# Patient Record
Sex: FEMALE | Race: BLACK OR AFRICAN AMERICAN | Employment: FULL TIME | URBAN - METROPOLITAN AREA
[De-identification: names, ages, dates, MRNs, and addresses within clinical notes are randomized per-mention and may not be internally consistent; named-entity substitution may affect disease eponyms.]

---

## 2018-02-26 ENCOUNTER — PAT TELEPHONE (OUTPATIENT)
Dept: PREADMISSION TESTING | Age: 53
End: 2018-02-26

## 2018-02-26 VITALS — BODY MASS INDEX: 29.19 KG/M2 | WEIGHT: 171 LBS | HEIGHT: 64 IN

## 2018-02-27 ENCOUNTER — HOSPITAL ENCOUNTER (OUTPATIENT)
Dept: ENDOSCOPY | Age: 53
Discharge: HOME | End: 2018-02-27
Attending: INTERNAL MEDICINE | Admitting: INTERNAL MEDICINE

## 2018-02-27 VITALS
BODY MASS INDEX: 29.06 KG/M2 | DIASTOLIC BLOOD PRESSURE: 95 MMHG | SYSTOLIC BLOOD PRESSURE: 126 MMHG | RESPIRATION RATE: 16 BRPM | HEIGHT: 64 IN | OXYGEN SATURATION: 100 % | WEIGHT: 170.25 LBS | HEART RATE: 81 BPM | TEMPERATURE: 97 F

## 2018-02-27 LAB — PREGNANCY, URINE: NEGATIVE

## 2018-02-27 RX ORDER — SODIUM CHLORIDE 0.9 % (FLUSH) 0.9 %
10 SYRINGE (ML) INJECTION EVERY 12 HOURS SCHEDULED
Status: DISCONTINUED | OUTPATIENT
Start: 2018-02-27 | End: 2018-02-28 | Stop reason: HOSPADM

## 2018-02-27 RX ORDER — SODIUM CHLORIDE 9 MG/ML
INJECTION, SOLUTION INTRAVENOUS CONTINUOUS
Status: DISCONTINUED | OUTPATIENT
Start: 2018-02-27 | End: 2018-02-28 | Stop reason: HOSPADM

## 2018-02-27 RX ORDER — LABETALOL HYDROCHLORIDE 5 MG/ML
5 INJECTION, SOLUTION INTRAVENOUS EVERY 10 MIN PRN
Status: DISCONTINUED | OUTPATIENT
Start: 2018-02-27 | End: 2018-02-28 | Stop reason: HOSPADM

## 2018-02-27 RX ORDER — ONDANSETRON 2 MG/ML
4 INJECTION INTRAMUSCULAR; INTRAVENOUS
Status: ACTIVE | OUTPATIENT
Start: 2018-02-27 | End: 2018-02-27

## 2018-02-27 RX ORDER — SODIUM CHLORIDE 0.9 % (FLUSH) 0.9 %
10 SYRINGE (ML) INJECTION PRN
Status: DISCONTINUED | OUTPATIENT
Start: 2018-02-27 | End: 2018-02-28 | Stop reason: HOSPADM

## 2018-02-27 RX ORDER — PROMETHAZINE HYDROCHLORIDE 25 MG/ML
6.25 INJECTION, SOLUTION INTRAMUSCULAR; INTRAVENOUS
Status: ACTIVE | OUTPATIENT
Start: 2018-02-27 | End: 2018-02-27

## 2018-02-27 RX ADMIN — SODIUM CHLORIDE: 9 INJECTION, SOLUTION INTRAVENOUS at 08:08

## 2018-02-27 ASSESSMENT — PAIN SCALES - GENERAL
PAINLEVEL_OUTOF10: 0
PAINLEVEL_OUTOF10: 0

## 2018-02-27 ASSESSMENT — PAIN - FUNCTIONAL ASSESSMENT: PAIN_FUNCTIONAL_ASSESSMENT: 0-10

## 2018-02-27 ASSESSMENT — PAIN SCALES - WONG BAKER: WONGBAKER_NUMERICALRESPONSE: 0

## 2018-02-27 NOTE — PROCEDURES
[facility]  Endoscopy Note    Patient: Benjamín Babb  : 1965  Acct#: [de-identified]    Procedure: Colonoscopy     Date:  2018    Surgeon:  Yogi Millan MD    Referring Physician:  Tran    Previous Colonoscopy: Yes  Date: 16  Greater than 3 years? No    Preoperative Diagnosis:  Hemoccult positive stool    Postoperative Diagnosis:  Hemorrhoids    Anesthesia:  See anesthesia note    Indications: This is a 45y.o. year old female who presents today with hemoccult positive stool. Procedure: An informed consent was obtained from the patient after explanation of indications, benefits, possible risks and complications of the procedure. The patient was then taken to the endoscopy suite, placed in the left lateral decubitus position, and the above IV anesthesia was administered. A digital rectal examination was performed and revealed negative without mass, lesions or tenderness. The Olympus CFQ-180-AL video colonoscope was placed in the patient's rectum under digital direction and advanced to the cecum. The cecum was identified by characteristic anatomy and ballottment. The prep was excellent. The ileocecal valve was identified. The scope was then withdrawn back through the cecum, ascending, transverse, descending and sigmoid colons. Carefull circumferential examination of the mucosa in these areas demonstrated normal colonic mucosa throughout. There are internal hemorrhoids. The scope was then withdrawn into the rectum and retroflexed. The retroflexed view of the anal verge and rectum demonstrates no abnormalities. The scope was straightened, the colon was decompressed and the scope was withdrawn from the patient. The patient tolerated the procedure well and was taken to the PACU in good condition. Estimated Blood Loss:  none    Impression:  Internal hemorrhoids    Recommendations:  Repeat colonoscopy in 5 years.     Yogi Millan MD   [facility]  2018

## 2018-02-27 NOTE — ANESTHESIA POST-OP
[facility] Department of Anesthesiology  Post-Anesthesia Note       Name:  Brad Turpin                                  Age:  45 y.o. MRN:  [id number]     Last Vitals & Oxygen Saturation: BP (!) 126/95   Pulse 81   Temp 97 °F (36.1 °C) (Temporal)   Resp 16   Ht 5' 4\" (1.626 m)   Wt 170 lb 4 oz (77.2 kg)   LMP 01/29/2018   SpO2 100%   BMI 29.22 kg/m²   Patient Vitals for the past 4 hrs:   BP Temp Temp src Pulse Resp SpO2 Height Weight   02/27/18 0903 (!) 126/95 - - 81 16 100 % - -   02/27/18 0853 109/73 - - 80 16 100 % - -   02/27/18 0843 106/69 97 °F (36.1 °C) Temporal 87 18 100 % - -   02/27/18 0758 137/88 97.8 °F (36.6 °C) Temporal 76 14 100 % 5' 4\" (1.626 m) 170 lb 4 oz (77.2 kg)       Level of consciousness:  Awake, alert    Respiratory: Respirations easy, no distress. Stable. Cardiovascular: Hemodynamically stable. Hydration: Adequate. PONV: Adequately managed. Post-op pain: Adequately controlled. Post-op assessment: Tolerated anesthetic well without complication. Complications:  None.     Grace Winston MD  February 27, 2018   10:34 AM

## 2018-02-27 NOTE — ANESTHESIA PRE-OP
GI/Hepatic/Renal: Neg GI/Hepatic/Renal ROS  (+) bowel prep,           Endo/Other: Negative Endo/Other ROS                    Abdominal:           Vascular: negative vascular ROS. Anesthesia Plan      MAC     ASA 2       Induction: intravenous. Anesthetic plan and risks discussed with patient. Plan discussed with CRNA. This pre-anesthesia assessment may be used as a history and physical.    DOS STAFF ADDENDUM:    Pt seen and examined, chart reviewed (including anesthesia, drug and allergy history). No interval changes to history and physical examination. Anesthetic plan, risks, benefits, alternatives, and personnel involved discussed with patient. Patient verbalized an understanding and agrees to proceed.       Joelle Escobar MD  February 27, 2018  8:16 AM

## 2019-06-19 RX ORDER — M-VIT,TX,IRON,MINS/CALC/FOLIC 27MG-0.4MG
1 TABLET ORAL DAILY
COMMUNITY

## 2019-06-19 RX ORDER — HYDROCODONE BITARTRATE AND ACETAMINOPHEN 5; 325 MG/1; MG/1
1 TABLET ORAL EVERY 6 HOURS PRN
COMMUNITY

## 2019-06-19 RX ORDER — ACETAMINOPHEN 160 MG
TABLET,DISINTEGRATING ORAL
COMMUNITY

## 2019-06-19 NOTE — PROGRESS NOTES
[facility] time___0800_________        Surgery time___0900_________    Take the following medications with a sip of water:    Do not eat or drink anything after 12:00 midnight prior to your surgery. This includes water chewing gum, mints and ice chips. You may brush your teeth and gargle the morning of your surgery, but do not swallow the water    You may be asked to stop blood thinners such as Coumadin, Plavix, Fragmin, Lovenox, etc., or any anti-inflammatories such as:  Aspirin, Ibuprofen, Advil, Naproxen prior to your surgery. We also ask that you stop any OTC medications such as fish oil, vitamin E, glucosamine, garlic, Multivitamins, COQ 10, etc.    We ask that you do not smoke 24 hours prior to surgery  We ask that you do not  drink any alcoholic beverages 24 hours prior to surgery     You must make arrangements for a responsible adult to take you home after your surgery. For your safety you will not be allowed to leave alone or drive yourself home. Your surgery will be cancelled if you do not have a ride home. Also for your safety, it is strongly suggested that someone stay with you the first 24 hours after your surgery. A parent or legal guardian must accompany a child scheduled for surgery and plan to stay at the hospital until the child is discharged. Please do not bring other children with you. For your comfort, please wear simple loose fitting clothing to the hospital.  Please do not bring valuables. Do not wear any make-up or nail polish on your fingers or toes      For your safety, please do not wear any jewelry or body piercing's on the day of surgery. All jewelry must be removed. If you have dentures, they will be removed before going to operating room. For your convenience, we will provide you with a container. If you wear contact lenses or glasses, they will be removed, please bring a case for them.      If you have a living will and a durable power of  for healthcare, please bring in a copy. As part of our patient safety program to minimize surgical site infections, we ask you to do the following:    · Please notify your surgeon if you develop any illness between         now and the  day of your surgery. · This includes a cough, cold, fever, sore throat, nausea,         or vomiting, and diarrhea, etc.  ·  Please notify your surgeon if you experience dizziness, shortness         of breath or blurred vision between now and the time of your surgery. You may shower the night before surgery or the morning of   your surgery with an antibacterial soap. You will need to bring a photo ID and insurance card    [facility] has an onsite pharmacy, would you like to utilize our pharmacy     If you will be staying overnight and use a C-pap machine, please bring   your C-pap to hospital     Our goal is to provide you with excellent care, therefore, visitors will be limited to two(2) in the room at a time so that we may focus on providing this care for you. Please contact pre-admission testing if you have any further questions. [facility] phone number:  [phone]  Please note these are generalized instructions for all surgical cases, you may be provided with more specific instructions according to your surgery.

## 2019-06-26 ENCOUNTER — ANESTHESIA EVENT (OUTPATIENT)
Dept: ENDOSCOPY | Age: 54
End: 2019-06-26
Payer: COMMERCIAL

## 2019-06-27 ENCOUNTER — HOSPITAL ENCOUNTER (OUTPATIENT)
Age: 54
Setting detail: OUTPATIENT SURGERY
Discharge: HOME | End: 2019-06-27
Attending: INTERNAL MEDICINE | Admitting: INTERNAL MEDICINE
Payer: COMMERCIAL

## 2019-06-27 ENCOUNTER — ANESTHESIA (OUTPATIENT)
Dept: ENDOSCOPY | Age: 54
End: 2019-06-27
Payer: COMMERCIAL

## 2019-06-27 VITALS
HEIGHT: 65 IN | OXYGEN SATURATION: 100 % | WEIGHT: 175.4 LBS | HEART RATE: 68 BPM | SYSTOLIC BLOOD PRESSURE: 148 MMHG | DIASTOLIC BLOOD PRESSURE: 92 MMHG | TEMPERATURE: 97 F | BODY MASS INDEX: 29.22 KG/M2 | RESPIRATION RATE: 16 BRPM

## 2019-06-27 VITALS
TEMPERATURE: 97.7 F | DIASTOLIC BLOOD PRESSURE: 68 MMHG | SYSTOLIC BLOOD PRESSURE: 107 MMHG | RESPIRATION RATE: 21 BRPM | OXYGEN SATURATION: 97 %

## 2019-06-27 DIAGNOSIS — K21.9 GASTROESOPHAGEAL REFLUX DISEASE, ESOPHAGITIS PRESENCE NOT SPECIFIED: ICD-10-CM

## 2019-06-27 PROCEDURE — 3700000000 HC ANESTHESIA ATTENDED CARE: Performed by: INTERNAL MEDICINE

## 2019-06-27 PROCEDURE — 3609012400 HC EGD TRANSORAL BIOPSY SINGLE/MULTIPLE: Performed by: INTERNAL MEDICINE

## 2019-06-27 PROCEDURE — 2580000003 HC RX 258: Performed by: ANESTHESIOLOGY

## 2019-06-27 PROCEDURE — 2580000003 HC RX 258: Performed by: NURSE ANESTHETIST, CERTIFIED REGISTERED

## 2019-06-27 PROCEDURE — 88305 TISSUE EXAM BY PATHOLOGIST: CPT

## 2019-06-27 PROCEDURE — 6360000002 HC RX W HCPCS: Performed by: NURSE ANESTHETIST, CERTIFIED REGISTERED

## 2019-06-27 PROCEDURE — 2500000003 HC RX 250 WO HCPCS: Performed by: NURSE ANESTHETIST, CERTIFIED REGISTERED

## 2019-06-27 PROCEDURE — 7100000011 HC PHASE II RECOVERY - ADDTL 15 MIN: Performed by: INTERNAL MEDICINE

## 2019-06-27 PROCEDURE — 7100000010 HC PHASE II RECOVERY - FIRST 15 MIN: Performed by: INTERNAL MEDICINE

## 2019-06-27 PROCEDURE — 2709999900 HC NON-CHARGEABLE SUPPLY: Performed by: INTERNAL MEDICINE

## 2019-06-27 PROCEDURE — 3700000001 HC ADD 15 MINUTES (ANESTHESIA): Performed by: INTERNAL MEDICINE

## 2019-06-27 RX ORDER — LIDOCAINE HYDROCHLORIDE 20 MG/ML
INJECTION, SOLUTION EPIDURAL; INFILTRATION; INTRACAUDAL; PERINEURAL PRN
Status: DISCONTINUED | OUTPATIENT
Start: 2019-06-27 | End: 2019-06-27 | Stop reason: SDUPTHER

## 2019-06-27 RX ORDER — ONDANSETRON 2 MG/ML
4 INJECTION INTRAMUSCULAR; INTRAVENOUS
Status: DISCONTINUED | OUTPATIENT
Start: 2019-06-27 | End: 2019-06-27 | Stop reason: HOSPADM

## 2019-06-27 RX ORDER — SODIUM CHLORIDE 9 MG/ML
INJECTION, SOLUTION INTRAVENOUS CONTINUOUS
Status: DISCONTINUED | OUTPATIENT
Start: 2019-06-27 | End: 2019-06-27 | Stop reason: HOSPADM

## 2019-06-27 RX ORDER — LIDOCAINE HYDROCHLORIDE 10 MG/ML
1 INJECTION, SOLUTION EPIDURAL; INFILTRATION; INTRACAUDAL; PERINEURAL
Status: DISCONTINUED | OUTPATIENT
Start: 2019-06-27 | End: 2019-06-27 | Stop reason: HOSPADM

## 2019-06-27 RX ORDER — SODIUM CHLORIDE 9 MG/ML
INJECTION, SOLUTION INTRAVENOUS CONTINUOUS PRN
Status: DISCONTINUED | OUTPATIENT
Start: 2019-06-27 | End: 2019-06-27 | Stop reason: SDUPTHER

## 2019-06-27 RX ORDER — SODIUM CHLORIDE 0.9 % (FLUSH) 0.9 %
10 SYRINGE (ML) INJECTION EVERY 12 HOURS SCHEDULED
Status: DISCONTINUED | OUTPATIENT
Start: 2019-06-27 | End: 2019-06-27 | Stop reason: HOSPADM

## 2019-06-27 RX ORDER — SODIUM CHLORIDE 0.9 % (FLUSH) 0.9 %
10 SYRINGE (ML) INJECTION PRN
Status: DISCONTINUED | OUTPATIENT
Start: 2019-06-27 | End: 2019-06-27 | Stop reason: HOSPADM

## 2019-06-27 RX ORDER — PROPOFOL 10 MG/ML
INJECTION, EMULSION INTRAVENOUS PRN
Status: DISCONTINUED | OUTPATIENT
Start: 2019-06-27 | End: 2019-06-27 | Stop reason: SDUPTHER

## 2019-06-27 RX ADMIN — PROPOFOL 200 MG: 10 INJECTION, EMULSION INTRAVENOUS at 08:57

## 2019-06-27 RX ADMIN — SODIUM CHLORIDE: 9 INJECTION, SOLUTION INTRAVENOUS at 08:57

## 2019-06-27 RX ADMIN — LIDOCAINE HYDROCHLORIDE 100 MG: 20 INJECTION, SOLUTION EPIDURAL; INFILTRATION; INTRACAUDAL; PERINEURAL at 08:57

## 2019-06-27 RX ADMIN — SODIUM CHLORIDE: 0.9 INJECTION, SOLUTION INTRAVENOUS at 08:42

## 2019-06-27 ASSESSMENT — PAIN SCALES - GENERAL
PAINLEVEL_OUTOF10: 0

## 2019-06-27 ASSESSMENT — ENCOUNTER SYMPTOMS: SHORTNESS OF BREATH: 0

## 2019-06-27 ASSESSMENT — PAIN - FUNCTIONAL ASSESSMENT: PAIN_FUNCTIONAL_ASSESSMENT: 0-10

## 2019-06-27 NOTE — H&P
[facility]   Pre-operative History and Physical    Patient: Craig Lerma  : 1965  Acct#: [de-identified]    History Obtained From: electronic medical record    HISTORY OF PRESENT ILLNESS  Procedure:EGD  Indications:GERD  Past Medical History:        Diagnosis Date    Hyperlipidemia     Hypertension      Past Surgical History:        Procedure Laterality Date     SECTION      LAPAROSCOPY       Medications prior to admission:   Prior to Admission medications    Medication Sig Start Date End Date Taking? Authorizing Provider   LOSARTAN POTASSIUM PO Take by mouth   Yes Historical Provider, MD   Multiple Vitamins-Minerals (THERAPEUTIC MULTIVITAMIN-MINERALS) tablet Take 1 tablet by mouth daily   Yes Historical Provider, MD   Cholecalciferol (VITAMIN D3) 2000 units CAPS Take by mouth   Yes Historical Provider, MD   HYDROcodone-acetaminophen (NORCO) 5-325 MG per tablet Take 1 tablet by mouth every 6 hours as needed for Pain. Yes Historical Provider, MD   SIMVASTATIN PO Take 20 mg by mouth    Yes Historical Provider, MD     Allergies:   Patient has no known allergies. Social History     Socioeconomic History    Marital status:      Spouse name: Not on file    Number of children: Not on file    Years of education: Not on file    Highest education level: Not on file   Occupational History    Not on file   Social Needs    Financial resource strain: Not on file    Food insecurity:     Worry: Not on file     Inability: Not on file    Transportation needs:     Medical: Not on file     Non-medical: Not on file   Tobacco Use    Smoking status: Former Smoker    Smokeless tobacco: Never Used   Substance and Sexual Activity    Alcohol use:  Yes    Drug use: No    Sexual activity: Not on file   Lifestyle    Physical activity:     Days per week: Not on file     Minutes per session: Not on file    Stress: Not on file   Relationships    Social connections:     Talks on phone: Not on

## 2019-06-27 NOTE — ANESTHESIA POSTPROCEDURE EVALUATION
Department of Anesthesiology  Postprocedure Note    Patient: Mayra Ramos  MRN: [id number]  YOB: 1965  Date of evaluation: 6/27/2019  Time:  9:42 AM     Procedure Summary     Date:  06/27/19 Room / Location:  [facility] ENDO 02 / [facility] ENDOSCOPY    Anesthesia Start:  6887 Anesthesia Stop:  1199    Procedure:  EGD BIOPSY (N/A ) Diagnosis:       Gastroesophageal reflux disease, esophagitis presence not specified      (REFLUX)    Surgeon:  Magda Wei MD Responsible Provider:  Vinicio Bassett MD    Anesthesia Type:  MAC ASA Status:  2          Anesthesia Type: MAC    Mary Phase I: Mary Score: 10    Mary Phase II: Mary Score: 10    Last vitals: Reviewed and per EMR flowsheets.        Anesthesia Post Evaluation    Patient location during evaluation: bedside  Patient participation: complete - patient participated  Level of consciousness: awake  Pain score: 1  Airway patency: patent  Nausea & Vomiting: no nausea and no vomiting  Complications: no  Cardiovascular status: blood pressure returned to baseline  Respiratory status: acceptable  Hydration status: euvolemic

## 2019-06-27 NOTE — ANESTHESIA PRE PROCEDURE
Department of Anesthesiology  Preprocedure Note       Name:  Filomena Cardenas   Age:  52 y.o.  :  1965                                          MRN:  [id number]         Date:  2019      Surgeon: Jennifer Vargas):  Robert Mccormick MD    Procedure: EGD ESOPHAGOGASTRODUODENOSCOPY (N/A )    Medications prior to admission:   Prior to Admission medications    Medication Sig Start Date End Date Taking? Authorizing Provider   LOSARTAN POTASSIUM PO Take by mouth   Yes Historical Provider, MD   Multiple Vitamins-Minerals (THERAPEUTIC MULTIVITAMIN-MINERALS) tablet Take 1 tablet by mouth daily   Yes Historical Provider, MD   Cholecalciferol (VITAMIN D3) 2000 units CAPS Take by mouth   Yes Historical Provider, MD   HYDROcodone-acetaminophen (NORCO) 5-325 MG per tablet Take 1 tablet by mouth every 6 hours as needed for Pain. Yes Historical Provider, MD   SIMVASTATIN PO Take 20 mg by mouth    Yes Historical Provider, MD       Current medications:    No current facility-administered medications for this encounter. Current Outpatient Medications   Medication Sig Dispense Refill    LOSARTAN POTASSIUM PO Take by mouth      Multiple Vitamins-Minerals (THERAPEUTIC MULTIVITAMIN-MINERALS) tablet Take 1 tablet by mouth daily      Cholecalciferol (VITAMIN D3) 2000 units CAPS Take by mouth      HYDROcodone-acetaminophen (NORCO) 5-325 MG per tablet Take 1 tablet by mouth every 6 hours as needed for Pain.  SIMVASTATIN PO Take 20 mg by mouth          Allergies:  No Known Allergies    Problem List:  There is no problem list on file for this patient. Past Medical History:        Diagnosis Date    Hyperlipidemia     Hypertension        Past Surgical History:        Procedure Laterality Date     SECTION      LAPAROSCOPY         Social History:    Social History     Tobacco Use    Smoking status: Former Smoker    Smokeless tobacco: Never Used   Substance Use Topics    Alcohol use:  Yes Counseling given: Not Answered      Vital Signs (Current):   Vitals:    06/19/19 1231   Weight: 175 lb (79.4 kg)   Height: 5' 4.5\" (1.638 m)                                              BP Readings from Last 3 Encounters:   02/27/18 (!) 126/95       NPO Status:                                                                                 BMI:   Wt Readings from Last 3 Encounters:   02/27/18 170 lb 4 oz (77.2 kg)   02/26/18 171 lb (77.6 kg)   05/10/10 180 lb (81.6 kg)     Body mass index is 29.57 kg/m². CBC: No results found for: WBC, RBC, HGB, HCT, MCV, RDW, PLT    CMP: No results found for: NA, K, CL, CO2, BUN, CREATININE, GFRAA, AGRATIO, LABGLOM, GLUCOSE, PROT, CALCIUM, BILITOT, ALKPHOS, AST, ALT    POC Tests: No results for input(s): POCGLU, POCNA, POCK, POCCL, POCBUN, POCHEMO, POCHCT in the last 72 hours. Coags: No results found for: PROTIME, INR, APTT    HCG (If Applicable):   Lab Results   Component Value Date    PREGTESTUR Negative 02/27/2018        ABGs: No results found for: PHART, PO2ART, LZY5RXD, THB6XAG, BEART, I7HVAPES     Type & Screen (If Applicable):  No results found for: KORY [facility]    Anesthesia Evaluation  Patient summary reviewed no history of anesthetic complications:   Airway: Mallampati: II  TM distance: >3 FB   Neck ROM: full  Mouth opening: > = 3 FB Dental:          Pulmonary:       (-) shortness of breath, recent URI and sleep apnea                           Cardiovascular:    (+) hypertension:, hyperlipidemia                  Neuro/Psych:      (-) neuromuscular disease, TIA and CVA           GI/Hepatic/Renal:   (+) liver disease:,      (-) GERD and hepatitis       Endo/Other:        (-) diabetes mellitus, hypothyroidism, hyperthyroidism               Abdominal:           Vascular:     - PVD, DVT and PE.                                This pre-anesthesia assessment may be used as a history and physical.    DOS STAFF ADDENDUM:    Pt seen and examined, chart reviewed (including anesthesia, drug and allergy history). No interval changes to history and physical examination. Anesthetic plan, risks, benefits, alternatives, and personnel involved discussed with patient. Patient verbalized an understanding and agrees to proceed. Shabbir Ballard MD  June 27, 2019  7:18 AM       Anesthesia Plan      MAC     ASA 2       Induction: intravenous. Anesthetic plan and risks discussed with patient. Plan discussed with CRNA.     Attending anesthesiologist reviewed and agrees with Pre Eval content              Shabbir Ballard MD   6/27/2019

## 2022-04-18 ENCOUNTER — OFFICE VISIT (OUTPATIENT)
Dept: ORTHOPEDIC SURGERY | Age: 57
End: 2022-04-18
Payer: COMMERCIAL

## 2022-04-18 DIAGNOSIS — M16.11 PRIMARY OSTEOARTHRITIS OF RIGHT HIP: ICD-10-CM

## 2022-04-18 DIAGNOSIS — M25.551 RIGHT HIP PAIN: Primary | ICD-10-CM

## 2022-04-18 PROCEDURE — 99204 OFFICE O/P NEW MOD 45 MIN: CPT | Performed by: ORTHOPAEDIC SURGERY

## 2022-04-18 RX ORDER — CELECOXIB 200 MG/1
200 CAPSULE ORAL DAILY
Qty: 60 CAPSULE | Refills: 3 | Status: SHIPPED | OUTPATIENT
Start: 2022-04-18 | End: 2022-10-17 | Stop reason: SDUPTHER

## 2022-04-18 NOTE — PROGRESS NOTES
Date:  2022    Name:  Coy Cruz  Address:  [address] [place] [address]    :  1965      Age:   60 y.o.    SSN:  [id number]      Medical Record Number:  [id number]    Reason for Visit:    Chief Complaint    Hip Pain (new patient right hip )      DOS:2022     HPI: Coy Cruz is a 60 y.o. female here today for evaluation of right hip pain that has been ongoing for 6 years with no associated injury. She has been treated in the past for mild osteoarthritis with formal supervised physical therapy. This did not provide any relief. She has tried greater trochanteric bursitis injections with no improvement. Patient describes pain that is in her groin and deep. She feels like there is a stabbing pain deep in her groin and she has very compressed. ROS: Review of systems reviewed from Patient History Form completed today and available in the patient's chart under the Media tab. Past Medical History:   Diagnosis Date    Hyperlipidemia     Hypertension         Past Surgical History:   Procedure Laterality Date     SECTION      LAPAROSCOPY      UPPER GASTROINTESTINAL ENDOSCOPY N/A 2019    EGD BIOPSY performed by Selam Lara MD at [place] [address]       Family History   Problem Relation Age of Onset    High Blood Pressure Mother     High Cholesterol Mother     High Blood Pressure Father     High Cholesterol Father        Social History     Socioeconomic History    Marital status:      Spouse name: Not on file    Number of children: Not on file    Years of education: Not on file    Highest education level: Not on file   Occupational History    Not on file   Tobacco Use    Smoking status: Former Smoker    Smokeless tobacco: Never Used   Vaping Use    Vaping Use: Never used   Substance and Sexual Activity    Alcohol use:  Yes    Drug use: No    Sexual activity: Not on file   Other Topics Concern    Not on file   Social History Narrative    Not on file     Social Determinants of Health     Financial Resource Strain:     Difficulty of Paying Living Expenses: Not on file   Food Insecurity:     Worried About Running Out of Food in the Last Year: Not on file    [place] of Food in the Last Year: Not on file   Transportation Needs:     Lack of Transportation (Medical): Not on file    Lack of Transportation (Non-Medical): Not on file   Physical Activity:     Days of Exercise per Week: Not on file    Minutes of Exercise per Session: Not on file   Stress:     Feeling of Stress : Not on file   Social Connections:     Frequency of Communication with Friends and Family: Not on file    Frequency of Social Gatherings with Friends and Family: Not on file    Attends Worship Services: Not on file    Active Member of [address] [place] [address] [employer] or Organizations: Not on file    Attends Club or Organization Meetings: Not on file    Marital Status: Not on file   Intimate Partner Violence:     Fear of Current or Ex-Partner: Not on file    Emotionally Abused: Not on file    Physically Abused: Not on file    Sexually Abused: Not on file   Housing Stability:     Unable to Pay for Housing in the Last Year: Not on file    Number of [place] in the Last Year: Not on file    Unstable Housing in the Last Year: Not on file       Current Outpatient Medications   Medication Sig Dispense Refill    celecoxib (CELEBREX) 200 MG capsule Take 1 capsule by mouth daily 60 capsule 3    LOSARTAN POTASSIUM PO Take by mouth      Multiple Vitamins-Minerals (THERAPEUTIC MULTIVITAMIN-MINERALS) tablet Take 1 tablet by mouth daily      Cholecalciferol (VITAMIN D3) 2000 units CAPS Take by mouth      HYDROcodone-acetaminophen (NORCO) 5-325 MG per tablet Take 1 tablet by mouth every 6 hours as needed for Pain.  SIMVASTATIN PO Take 20 mg by mouth        No current facility-administered medications for this visit. No Known Allergies    Vital signs:   There were no vitals taken for this visit. Right hip examination:    Gait: Normal     Skin: There are no rashes, ulcerations or lesions. Inspection:  No erythema swelling or signs of infection. Leg lengths symmetric. No evidence of hip flexion contracture. Palpation: No tenderness over greater trochanter. Nontender over the gluteus medius. .  No palpable masses. Tender in hip crease      Range of Motion: Limited flexion, internal rotation, external rotation     Strength:  5/5 hip flexion and abduction and adduction. Appears symmetric. Stability: No subluxation. Vascular: Skin warm dry and well perfused. No calf tenderness. Neurologic: No focal motor deficits. Sensation intact. Special Tests:  Negative Trendelenburg sign. Left hip comparison examination:    Gait: Normal     Skin: There are no rashes, ulcerations or lesions. Inspection:  No erythema swelling or signs of infection. Leg lengths symmetric. No evidence of hip flexion contracture. Palpation: No tenderness over greater trochanter. Nontender over the gluteus medius. .  No palpable masses. Range of Motion: Full pain-free range of motion. Strength:  5/5 hip flexion and abduction and adduction. Appears symmetric. Stability: No subluxation. Vascular: Skin warm dry and well perfused. No calf tenderness. Neurologic: No focal motor deficits. Sensation intact. Special Tests:  Negative Trendelenburg sign. Diagnostics:  Radiology:       Pertinent imaging was obtained, interpreted, and reviewed with the patient today, both images and report.      Right hip x-ray:    AP, and frog leg lateral views were obtained  and reviewed of the right hip     Impression: right hip osteoarthritis    Office Procedures:  Orders Placed This Encounter   Procedures    XR HIP RIGHT (2-3 VIEWS)     Standing Status:   Future     Number of Occurrences:   1     Standing Expiration Date:   4/18/2023     Order Specific Question: Reason for exam:     Answer:   pain       Assessment: 61 yo female with right hip osteoarthritis    Plan: Pertinent imaging was reviewed. The etiology, natural history, and treatment options for the disorder were discussed. The roles of activity medication, antiinflammatories, injections, bracing, physical therapy, and surgical interventions were all described to the patient and questions were answered. Mrs. Roby Rodríguez has right hip osteoarthritis. She has tried therapy and a greater trochanteric bursa injection with no improvement. At this time she is a candidate for oral antiinflammatories and continuing her therapy exercises. She has GI sensitivity to NSAIDs, therefore she is a candidate for celebrex. If no improvement she would be a candidate for a referral to Dr. Fabiola Sutton for possible intraarticular right hip cortisone injection. Willy Libman is in agreement with this plan. All questions were answered to patient's satisfaction and was encouraged to call with any further questions. Total time spent for evaluation, education, and development of treatment plan: 45 minutes    Gilma Urias [place] Shauna  4/18/2022    During this exam, I, Sheree Gayle PA-C, functioned as a scribe for Dr. Farhana Ramires. The history taking and physical examination were performed by Dr. Farhana Ramires. All counseling during the appointment was performed between the patient and Dr. Farhana Ramires. 4/18/2022 3:54 PM    This dictation was performed with a verbal recognition program (DRAGON) and it was checked for errors. It is possible that there are still dictated errors within this office note. If so, please bring any areas to my attention for an addendum. All efforts were made to ensure that this office note is accurate.     I attest that I met personally with the patient, performed the described exam, reviewed the radiographic studies and medical records associated with this patient and supervised the services that are described above.      Nathalie Collado MD

## 2022-07-19 ENCOUNTER — HOSPITAL ENCOUNTER (EMERGENCY)
Age: 57
Discharge: HOME | End: 2022-07-20
Attending: EMERGENCY MEDICINE
Payer: COMMERCIAL

## 2022-07-19 DIAGNOSIS — G44.019 EPISODIC CLUSTER HEADACHE, NOT INTRACTABLE: ICD-10-CM

## 2022-07-19 DIAGNOSIS — U07.1 COVID-19: Primary | ICD-10-CM

## 2022-07-19 PROCEDURE — 99284 EMERGENCY DEPT VISIT MOD MDM: CPT

## 2022-07-19 PROCEDURE — 96372 THER/PROPH/DIAG INJ SC/IM: CPT

## 2022-07-20 ENCOUNTER — APPOINTMENT (OUTPATIENT)
Dept: CT IMAGING | Age: 57
End: 2022-07-20
Payer: COMMERCIAL

## 2022-07-20 VITALS
OXYGEN SATURATION: 97 % | SYSTOLIC BLOOD PRESSURE: 174 MMHG | HEART RATE: 77 BPM | TEMPERATURE: 98.5 F | RESPIRATION RATE: 16 BRPM | DIASTOLIC BLOOD PRESSURE: 113 MMHG

## 2022-07-20 LAB — SARS-COV-2, NAAT: DETECTED

## 2022-07-20 PROCEDURE — 6360000002 HC RX W HCPCS: Performed by: STUDENT IN AN ORGANIZED HEALTH CARE EDUCATION/TRAINING PROGRAM

## 2022-07-20 PROCEDURE — 6370000000 HC RX 637 (ALT 250 FOR IP): Performed by: STUDENT IN AN ORGANIZED HEALTH CARE EDUCATION/TRAINING PROGRAM

## 2022-07-20 PROCEDURE — 87635 SARS-COV-2 COVID-19 AMP PRB: CPT

## 2022-07-20 PROCEDURE — 70450 CT HEAD/BRAIN W/O DYE: CPT

## 2022-07-20 RX ORDER — ONDANSETRON 2 MG/ML
4 INJECTION INTRAMUSCULAR; INTRAVENOUS ONCE
Status: DISCONTINUED | OUTPATIENT
Start: 2022-07-20 | End: 2022-07-20 | Stop reason: HOSPADM

## 2022-07-20 RX ORDER — KETOROLAC TROMETHAMINE 30 MG/ML
30 INJECTION, SOLUTION INTRAMUSCULAR; INTRAVENOUS ONCE
Status: DISCONTINUED | OUTPATIENT
Start: 2022-07-20 | End: 2022-07-20

## 2022-07-20 RX ORDER — ACETAMINOPHEN 325 MG/1
650 TABLET ORAL ONCE
Status: COMPLETED | OUTPATIENT
Start: 2022-07-20 | End: 2022-07-20

## 2022-07-20 RX ORDER — KETOROLAC TROMETHAMINE 30 MG/ML
30 INJECTION, SOLUTION INTRAMUSCULAR; INTRAVENOUS ONCE
Status: COMPLETED | OUTPATIENT
Start: 2022-07-20 | End: 2022-07-20

## 2022-07-20 RX ADMIN — KETOROLAC TROMETHAMINE 30 MG: 30 INJECTION, SOLUTION INTRAMUSCULAR; INTRAVENOUS at 01:06

## 2022-07-20 RX ADMIN — ACETAMINOPHEN 650 MG: 325 TABLET ORAL at 01:06

## 2022-07-20 ASSESSMENT — ENCOUNTER SYMPTOMS
EYE PAIN: 0
PHOTOPHOBIA: 1
EYE DISCHARGE: 0
EYE REDNESS: 0
RESPIRATORY NEGATIVE: 1
GASTROINTESTINAL NEGATIVE: 1
ALLERGIC/IMMUNOLOGIC NEGATIVE: 1

## 2022-07-20 ASSESSMENT — PAIN SCALES - GENERAL
PAINLEVEL_OUTOF10: 5
PAINLEVEL_OUTOF10: 10

## 2022-07-20 NOTE — ED TRIAGE NOTES
Pt. Presents to ED from home with c/o headache that started 7/19/22 and progressively gotten worse and a sore throat. States she ans high blood pressure and has not missed any doses of home blood pressure medications.

## 2022-07-20 NOTE — ED TRIAGE NOTES
tobacco. She reports current alcohol use. She reports that she does not use drugs. Medications     Previous Medications    CELECOXIB (CELEBREX) 200 MG CAPSULE    Take 1 capsule by mouth daily    CHOLECALCIFEROL (VITAMIN D3) 2000 UNITS CAPS    Take by mouth    HYDROCODONE-ACETAMINOPHEN (NORCO) 5-325 MG PER TABLET    Take 1 tablet by mouth every 6 hours as needed for Pain. LOSARTAN POTASSIUM PO    Take by mouth    MULTIPLE VITAMINS-MINERALS (THERAPEUTIC MULTIVITAMIN-MINERALS) TABLET    Take 1 tablet by mouth daily    SIMVASTATIN PO    Take 20 mg by mouth        Allergies     She has No Known Allergies. Physical Exam     INITIAL VITALS: BP: (!) 175/119, Temp: 98.5 °F (36.9 °C), Heart Rate: 87,  , SpO2: 96 %   Physical Exam  Vitals reviewed. Constitutional:       General: She is awake. She is not in acute distress. Appearance: Normal appearance. HENT:      Head: Normocephalic. Eyes:      General: Lids are normal. No allergic shiner, visual field deficit or scleral icterus. Extraocular Movements: Extraocular movements intact. Right eye: Normal extraocular motion and no nystagmus. Left eye: Normal extraocular motion and no nystagmus. Pupils: Pupils are equal, round, and reactive to light. Cardiovascular:      Rate and Rhythm: Normal rate and regular rhythm. Pulses: Normal pulses. Heart sounds: Normal heart sounds. Pulmonary:      Effort: Pulmonary effort is normal.      Breath sounds: Normal breath sounds. Abdominal:      General: Abdomen is flat. There is no distension. Palpations: There is no mass. Tenderness: There is no abdominal tenderness. There is no right CVA tenderness, left CVA tenderness, guarding or rebound. Hernia: No hernia is present. Musculoskeletal:      Cervical back: Normal range of motion and neck supple. Neurological:      General: No focal deficit present. Mental Status: She is oriented to person, place, and time. Psychiatric:         Mood and Affect: Mood normal.         Behavior: Behavior normal. Behavior is cooperative. DiagnosticResults         RADIOLOGY:  No orders to display       LABS:   No results found for this visit on 07/19/22. ED BEDSIDE ULTRASOUND:  No results found. RECENT VITALS:  BP: (!) 175/119, Temp: 98.5 °F (36.9 °C),  , ,       Procedures         ED Course     Nursing Notes, Past Medical Hx, Past Surgical Hx, Social Hx, Allergies, and Family Hx were reviewed. The patient was given the followingmedications:  Orders Placed This Encounter   Medications    acetaminophen (TYLENOL) tablet 650 mg       CONSULTS:  None    MEDICAL DECISION MAKING / ASSESSMENT / Sandra Bach is a 59 y.o. female PMhx of HTN presented to the ED due to headaches     # Headaches   - Complains of 10/10 headaches with elevated blood pressure, Acknowledges Sensitivity to light  and nausea   - COVID-19 PCR ordered, IM Toradol, IM Zofran  ordered   - CT head without contrast ordered to rule any potential causes of headaches. This patient was also evaluated by the attending physician. All care plans werediscussed and agreed upon. Clinical Impression     1. Intractable headache, unspecified chronicity pattern, unspecified headache type        Disposition     PATIENT REFERRED TO:  No follow-up provider specified. DISCHARGE MEDICATIONS:  New Prescriptions    No medications on file       DISPOSITION    Pending CT scan of the head.

## 2022-08-05 NOTE — ED NOTES
[facility] RESIDENT NOTE       Date of evaluation: 2022    Chief Complaint     Headache      of Present Illness     Javy Mireles is a 59 y.o. female who presented to the ED due to headaches. As per the patient she started having a headache which started around 9:00 AM which she described diffuse 10/10, never had a had a similar headache in the past. She acknowledges nausea but had no episodes of vomiting. She tried Tylenol PM in the morning and her percocet with no relief. Patient feels her blood pressure may be contributing to her headache and in addition feels her headaches are going to her throat. Patient said her son tested positive for COVID-19 and she feels she might be exposed to her son but did four COVID-19 tests which were within normal limits. Review of Systems     Review of Systems   Constitutional: Negative. Negative for chills. HENT: Negative. Eyes:  Positive for photophobia. Negative for pain, discharge, redness and visual disturbance. Respiratory: Negative. Cardiovascular: Negative. Gastrointestinal: Negative. Endocrine: Negative. Genitourinary: Negative. Musculoskeletal: Negative. Skin: Negative. Allergic/Immunologic: Negative. Neurological:  Positive for headaches. Negative for dizziness, tremors, seizures, syncope, facial asymmetry, speech difficulty, weakness, light-headedness and numbness. Hematological: Negative. Psychiatric/Behavioral: Negative. Past Medical, Surgical, Family, and Social History     She has a past medical history of Hyperlipidemia and Hypertension. She has a past surgical history that includes  section; laparoscopy; and Upper gastrointestinal endoscopy (N/A, 2019). Her family history includes High Blood Pressure in her father and mother; High Cholesterol in her father and mother. She reports that she has quit smoking.  She has never used smokeless tobacco. She reports current alcohol use. She reports that she does not use drugs. Medications     Discharge Medication List as of 7/20/2022  2:23 AM        CONTINUE these medications which have NOT CHANGED    Details   celecoxib (CELEBREX) 200 MG capsule Take 1 capsule by mouth daily, Disp-60 capsule, R-3Normal      LOSARTAN POTASSIUM PO Take by mouthHistorical Med      Multiple Vitamins-Minerals (THERAPEUTIC MULTIVITAMIN-MINERALS) tablet Take 1 tablet by mouth dailyHistorical Med      Cholecalciferol (VITAMIN D3) 2000 units CAPS Take by mouthHistorical Med      HYDROcodone-acetaminophen (NORCO) 5-325 MG per tablet Take 1 tablet by mouth every 6 hours as needed for Pain. Historical Med      SIMVASTATIN PO Take 20 mg by mouth Historical Med             Allergies     She has No Known Allergies. Physical Exam     INITIAL VITALS: BP: (!) 175/119, Temp: 98.5 °F (36.9 °C), Heart Rate: 87, Resp: 18, SpO2: 96 %   Physical Exam  Vitals reviewed. Constitutional:       General: She is awake. She is not in acute distress. Appearance: Normal appearance. HENT:      Head: Normocephalic. Eyes:      General: Lids are normal. No allergic shiner, visual field deficit or scleral icterus. Extraocular Movements: Extraocular movements intact. Right eye: Normal extraocular motion and no nystagmus. Left eye: Normal extraocular motion and no nystagmus. Pupils: Pupils are equal, round, and reactive to light. Cardiovascular:      Rate and Rhythm: Normal rate and regular rhythm. Pulses: Normal pulses. Heart sounds: Normal heart sounds. Pulmonary:      Effort: Pulmonary effort is normal.      Breath sounds: Normal breath sounds. Abdominal:      General: Abdomen is flat. There is no distension. Palpations: There is no mass. Tenderness: There is no abdominal tenderness. There is no right CVA tenderness, left CVA tenderness, guarding or rebound. Hernia: No hernia is present. Musculoskeletal:      Cervical back: Normal range of motion and neck supple. Neurological:      General: No focal deficit present. Mental Status: She is oriented to person, place, and time. Psychiatric:         Mood and Affect: Mood normal.         Behavior: Behavior normal. Behavior is cooperative. DiagnosticResults         RADIOLOGY:  CT HEAD WO CONTRAST   Final Result      No acute intracranial hemorrhage or mass effect. LABS:   Results for orders placed or performed during the hospital encounter of 22   COVID-19, Rapid    Specimen: Nasopharyngeal Swab   Result Value Ref Range    SARS-CoV-2, NAAT DETECTED (AA) Not Detected       ED BEDSIDE ULTRASOUND:  No results found. RECENT VITALS:  BP: (!) 174/113, Temp: 98.5 °F (36.9 °C), Heart Rate: 77,Resp: 16, SpO2: 97 %     Procedures         ED Course     Nursing Notes, Past Medical Hx, Past Surgical Hx, Social Hx, Allergies, and Family Hx were reviewed. The patient was given the followingmedications:  Orders Placed This Encounter   Medications    acetaminophen (TYLENOL) tablet 650 mg    DISCONTD: ketorolac (TORADOL) injection 30 mg    ketorolac (TORADOL) injection 30 mg    DISCONTD: ondansetron (ZOFRAN) injection 4 mg    nirmatrelvir/ritonavir (PAXLOVID) 20 x 150 MG & 10 x 100MG     Sig: Take 3 tablets (two 150 mg nirmatrelvir and one 100 mg ritonavir tablets) by mouth every 12 hours for 5 days. Dispense:  30 tablet     Refill:  0     Order Specific Question:   Does this patient qualify for COVID-19 antIviral therapy based on criteria for treatment? Answer:   Yes    Misc.  Devices (PULSE OXIMETER FOR FINGER) MISC     Si each by Does not apply route as needed (Shortness of breath)     Dispense:  1 each     Refill:  0       CONSULTS:  None    MEDICAL DECISION MAKING / ASSESSMENT / Shelly Parker is a 59 y.o. female PMhx of HTN presented to the ED due to headaches     # Headaches   - Complains of

## 2022-10-03 ENCOUNTER — OFFICE VISIT (OUTPATIENT)
Dept: ORTHOPEDIC SURGERY | Age: 57
End: 2022-10-03
Payer: COMMERCIAL

## 2022-10-03 DIAGNOSIS — M87.051 AVASCULAR NECROSIS OF BONE OF HIP, RIGHT (HCC): ICD-10-CM

## 2022-10-03 DIAGNOSIS — S83.282A ACUTE LATERAL MENISCUS TEAR OF LEFT KNEE, INITIAL ENCOUNTER: ICD-10-CM

## 2022-10-03 DIAGNOSIS — M25.562 LEFT KNEE PAIN, UNSPECIFIED CHRONICITY: Primary | ICD-10-CM

## 2022-10-03 PROCEDURE — 99214 OFFICE O/P EST MOD 30 MIN: CPT | Performed by: ORTHOPAEDIC SURGERY

## 2022-10-03 NOTE — PROGRESS NOTES
Date:  10/3/2022    Name:  [facility]  Address:  [address]    :  1965      Age:   59 y.o.    SSN:  [id number]      Medical Record Number:  [id number]    Reason for Visit:    Chief Complaint    Knee Pain (Old patient / new problem left knee )      DOS:10/3/2022     HPI: [facility] is a 59 y.o. female here today for evaluation left knee pain. Patient states she was walking around this past weekend more than normal and had acute left knee pain without identifiable injury. Pain is worse with movement and localized to the outside. She was seen in the urgent care and received an intramuscular steroid shot which helped relieve the pain. She continues to have mild discomfort with activities. Denies numbness, tingling, decreased sensation or diminished motor function in the extremity. She also endorses deep-seated groin pain is worse with activity. ROS: All systems reviewed on patient intake form. Pertinent items are noted in HPI. Past Medical History:   Diagnosis Date    Hyperlipidemia     Hypertension         Past Surgical History:   Procedure Laterality Date     SECTION      LAPAROSCOPY      UPPER GASTROINTESTINAL ENDOSCOPY N/A 2019    EGD BIOPSY performed by Sowmya Hughes MD at [facility]       Family History   Problem Relation Age of Onset    High Blood Pressure Mother     High Cholesterol Mother     High Blood Pressure Father     High Cholesterol Father        Social History     Socioeconomic History    Marital status:    Tobacco Use    Smoking status: Former    Smokeless tobacco: Never   Vaping Use    Vaping Use: Never used   Substance and Sexual Activity    Alcohol use: Yes    Drug use: No       Current Outpatient Medications   Medication Sig Dispense Refill    Misc.  Devices (PULSE OXIMETER FOR FINGER) MISC 1 each by Does not apply route as needed (Shortness of breath) 1 each 0    celecoxib (CELEBREX) 200 MG capsule Take 1 capsule by mouth daily 60 capsule 3    LOSARTAN POTASSIUM PO Take by mouth      Multiple Vitamins-Minerals (THERAPEUTIC MULTIVITAMIN-MINERALS) tablet Take 1 tablet by mouth daily      Cholecalciferol (VITAMIN D3) 2000 units CAPS Take by mouth      HYDROcodone-acetaminophen (NORCO) 5-325 MG per tablet Take 1 tablet by mouth every 6 hours as needed for Pain. SIMVASTATIN PO Take 20 mg by mouth        No current facility-administered medications for this visit. No Known Allergies    Vital signs: There were no vitals taken for this visit. Neuro: Alert & oriented x 3,  normal,  no focal deficits noted. Normal affect. Eyes: sclera clear  Ears: Normal external ear  Mouth:  No perioral lesions  Pulm: Respirations unlabored and regular  Pulse: Regular rate    Skin: Warm, well perfused        Left knee exam    Gait: No use of assistive devices. Antalgic  Alignment: normal alignment. Inspection/skin: Skin is intact without erythema or ecchymosis. No gross deformity. Palpation: no crepitus. Lateral joint line tenderness present. Range of Motion: There is full range of motion. Strength: Normal quadriceps development. Effusion: Mild effusion or swelling present. Ligamentous stability: No cruciate or collateral ligament instability. Neurologic and vascular: Skin is warm and well-perfused. Sensation is intact to light-touch. Special tests: Positive Viridiana sign. Pain with deep hyperflexion    Right hip Examination:    Gait: Normal     Skin: There are no rashes, ulcerations or lesions. Inspection:  No erythema swelling or signs of infection. Leg lengths symmetric. No evidence of hip flexion contracture. Palpation: No tenderness over greater trochanter. Nontender over the gluteus medius. .  No palpable masses. Range of Motion: Pain at terminal range of motion     Strength:  5/5 hip flexion and abduction and adduction. Appears symmetric.      Stability: No subluxation. Vascular: Skin warm dry and well perfused. No calf tenderness. Neurologic: No focal motor deficits. Sensation intact. Special Tests:  Negative Trendelenburg sign. Diagnostics:  Radiology:     Radiographs were obtained and reviewed in the office; 4 views: bilateral PA, bilateral Alvarez, bilateral Merchants AND left lateral    Impression: Mild osteoarthritis with maintained joint space. No fractures or dislocations      Assessment: 73-year-old female with suspected left knee lateral meniscus tear; right hip AVN    Plan: We discussed the patient's diagnosis of suspected left lateral meniscus tear and right hip AVN. At this point she is a candidate for MRI left knee and right hip. We will see her back after results obtained. Isabella Fry is in agreement with this plan. All questions were answered to patient's satisfaction and was encouraged to call with any further questions. Orders Placed This Encounter   Procedures    XR KNEE LEFT (MIN 4 VIEWS)     [id number]     Standing Status:   Future     Number of Occurrences:   1     Standing Expiration Date:   10/3/2023     Order Specific Question:   Reason for exam:     Answer:   Pain    XR KNEE RIGHT (3 VIEWS)     Standing Status:   Future     Number of Occurrences:   1     Standing Expiration Date:   10/3/2023     Order Specific Question:   Reason for exam:     Answer:   pain       Chad Yen D.O. Orthopedic Surgeon, [facility] Fellow    Total time spent for evaluation, education, and development of treatment plan: 30 minutes    I attest that I met personally with the patient, performed the described exam, reviewed the radiographic studies and medical records associated with this patient and supervised the services that are described above.      Dm Irwin MD

## 2022-10-17 ENCOUNTER — OFFICE VISIT (OUTPATIENT)
Dept: ORTHOPEDIC SURGERY | Age: 57
End: 2022-10-17
Payer: COMMERCIAL

## 2022-10-17 VITALS — WEIGHT: 180 LBS | BODY MASS INDEX: 30.73 KG/M2 | HEIGHT: 64 IN

## 2022-10-17 DIAGNOSIS — S83.282A TEAR OF LATERAL MENISCUS OF LEFT KNEE, UNSPECIFIED TEAR TYPE, UNSPECIFIED WHETHER OLD OR CURRENT TEAR, INITIAL ENCOUNTER: ICD-10-CM

## 2022-10-17 DIAGNOSIS — M16.11 PRIMARY OSTEOARTHRITIS OF RIGHT HIP: Primary | ICD-10-CM

## 2022-10-17 DIAGNOSIS — M76.01 GLUTEAL TENDINITIS, RIGHT HIP: ICD-10-CM

## 2022-10-17 PROCEDURE — 99214 OFFICE O/P EST MOD 30 MIN: CPT | Performed by: ORTHOPAEDIC SURGERY

## 2022-10-17 RX ORDER — CELECOXIB 200 MG/1
200 CAPSULE ORAL DAILY
Qty: 60 CAPSULE | Refills: 3 | Status: SHIPPED | OUTPATIENT
Start: 2022-10-17

## 2022-10-17 NOTE — PROGRESS NOTES
Chief Complaint  Follow-up (Mri left knee and right hip/)      History of Present Illness:  Caleb Benitez is a pleasant 59 y.o. female who presents today for follow up evaluation of left knee and right hip pain. She is here to review MRI results. Patient states she was walking around this past weekend more than normal and had acute left knee pain without identifiable injury. Pain is worse with movement and localized to the outside. She was seen in the urgent care and received an intramuscular steroid shot which helped relieve the pain. She continues to have mild discomfort with activities. Denies numbness, tingling, decreased sensation or diminished motor function in the extremity. She also endorses deep-seated groin pain is worse with activity. Medical History:  Patient's medications, allergies, past medical, surgical, social and family histories were reviewed and updated as appropriate. Pertinent items are noted in HPI  Review of systems reviewed from Patient History Form completed today and available in the patient's chart under the Media tab.          Pain Assessment  Location of Pain: Knee  Location Modifiers: Left  Severity of Pain: 4  Quality of Pain: Sharp, Aching  Duration of Pain: A few minutes  Frequency of Pain: Intermittent  Aggravating Factors:  (moving leg)  Limiting Behavior: No  Relieving Factors: Rest  Result of Injury: No  Work-Related Injury: No  Are there other pain locations you wish to document?: No    Past Medical History:   Diagnosis Date    Hyperlipidemia     Hypertension         Past Surgical History:   Procedure Laterality Date     SECTION      LAPAROSCOPY      UPPER GASTROINTESTINAL ENDOSCOPY N/A 2019    EGD BIOPSY performed by Maurizio Mc MD at [facility] ENDOSCOPY       Family History   Problem Relation Age of Onset    High Blood Pressure Mother     High Cholesterol Mother     High Blood Pressure Father     High Cholesterol Father        Social History Socioeconomic History    Marital status:      Spouse name: None    Number of children: None    Years of education: None    Highest education level: None   Tobacco Use    Smoking status: Former    Smokeless tobacco: Never   Vaping Use    Vaping Use: Never used   Substance and Sexual Activity    Alcohol use: Yes    Drug use: No       Current Outpatient Medications   Medication Sig Dispense Refill    Misc. Devices (PULSE OXIMETER FOR FINGER) MISC 1 each by Does not apply route as needed (Shortness of breath) 1 each 0    celecoxib (CELEBREX) 200 MG capsule Take 1 capsule by mouth daily 60 capsule 3    LOSARTAN POTASSIUM PO Take by mouth      Multiple Vitamins-Minerals (THERAPEUTIC MULTIVITAMIN-MINERALS) tablet Take 1 tablet by mouth daily      Cholecalciferol (VITAMIN D3) 2000 units CAPS Take by mouth      HYDROcodone-acetaminophen (NORCO) 5-325 MG per tablet Take 1 tablet by mouth every 6 hours as needed for Pain. SIMVASTATIN PO Take 20 mg by mouth        No current facility-administered medications for this visit. No Known Allergies    Vital signs:  Ht 5' 4\" (1.626 m)   Wt 180 lb (81.6 kg)   BMI 30.90 kg/m²            Left knee exam     Gait: No use of assistive devices. Antalgic  Alignment: normal alignment. Inspection/skin: Skin is intact without erythema or ecchymosis. No gross deformity. Palpation: no crepitus. Lateral joint line tenderness present. Range of Motion: There is full range of motion. Strength: Normal quadriceps development. Effusion: Mild effusion or swelling present. Ligamentous stability: No cruciate or collateral ligament instability. Neurologic and vascular: Skin is warm and well-perfused. Sensation is intact to light-touch. Special tests: Positive Viridiana sign. Pain with deep hyperflexion       Right hip Examination:     Gait: Normal     Skin: There are no rashes, ulcerations or lesions.      Inspection:  No erythema swelling or signs of infection. Leg lengths symmetric. No evidence of hip flexion contracture. Palpation: No tenderness over greater trochanter. Nontender over the gluteus medius. .  No palpable masses. Range of Motion: Pain at terminal range of motion     Strength:  5/5 hip flexion and abduction and adduction. Appears symmetric. Stability: No subluxation. Vascular: Skin warm dry and well perfused. No calf tenderness. Neurologic: No focal motor deficits. Sensation intact. Special Tests:  Negative Trendelenburg sign. Radiology:     Pertinent imaging was interpreted and reviewed with the patient, both images and report. MRI of the left knee dated 10/8/2022 was interpreted and reviewed with the patient today. CONCLUSION:   1. A horizontal tear at the midbody lateral meniscus is 5-10 mm in length. 2. No medial meniscus tear is identified. 3. Dehiscence of the semimembranosus-gastrocnemius bursa with soft tissue swelling extending    into the calf. 4. Mild impingement of Hoffa's fat pad.   5. Unchanged appearance of the lesion within the distal femoral metaphysis, which is consistent    with an enchondroma. MRI of the right hip dated 10/8/2022 was interpreted and reviewed with the patient today. CONCLUSION:   1. Regions of grade 3 chondromalacia right hip with subchondral degenerative cysts. 2. Tendinosis at the origins of right-sided hamstring tendons with mild peritendinitis. Mild    edema within the obturator internus muscle at the level of the ischial tuberosity. 3. Degeneration of the superior labrum. 4. Either a small cyst originating from the hip capsule or intralabral cyst is 2mm in size. Image numbers provided above. Assessment :  59year old female with lateral meniscus tear of left knee; osteoarthritis of right hip with gluteus tenidnitis    Impression:  Encounter Diagnoses   Name Primary?     Primary osteoarthritis of right hip Yes    Gluteal tendinitis, right hip     Tear of lateral meniscus of left knee, unspecified tear type, unspecified whether old or current tear, initial encounter        Office Procedures:  Orders Placed This Encounter   Procedures    Ambulatory referral to Physical Therapy     Referral Priority:   Routine     Referral Type:   Eval and Treat     Requested Specialty:   Physical Therapist     Number of Visits Requested:   1           Plan: Pertinent imaging was reviewed. The etiology, natural history, and treatment options for the disorder were discussed. The roles of activity medication, antiinflammatories, injections, bracing, physical therapy, and surgical interventions were all described to the patient and questions were answered. MRI of the left knee showed a small lateral meniscus tear. Diagnosis and treatment options were discussed. She would like to start conservatively, therefore I believe she is a candidate for formal, supervised physical therapy as well as a prescription of Celebrex for her pain and inflammation. She wishes to proceed. If no improvement, we can consider corticosteroid injection. MRI of the right hip showed osteoarthritis along with gluteal tendinitis. She has been to formal, supervised physical therapy so will return to those exercises and we discussed the hip should benefit from the Celebrex as well for her pain and inflammation. Also, if no improvement we will consider referral to Dr. Olga Simon for corticosteroid injection under ultrasound. The patient was advised that NSAID-type medications have several potential side effects that include: gastrointestinal irritation including hemorrhage, renal injury, as well as an increased risk for heart attack and stroke. The patient was asked to take the medication with food and to stop if there is any symptoms of GI upset, including heartburn, nausea, increased gas or diarrhea.  I asked the patient to contact their medical provider for vomiting, abdominal pain or black/bloody stools. The patient should have renal function testing per his medical provider periodically if the medication is taken on a regular basis. The patient should be alert for any swelling in the lower extremities and should stop taking the medication immediately and contact their medical provider should this occur. In addition, the patient should stop taking the medication immediately and contact their medical provider should there be any shortness of breath, fatigue and be evaluated in an emergency facility for any chest pain. The patient expresses understanding of these issues and questions were answered. I will see her back if symptoms persist or worsen. Patricia Dahl is in agreement with this plan. All questions were answered to patient's satisfaction and was encouraged to call with any further questions. Total time spent for evaluation, education and development of treatment plan: 35 minutes        Chery CHAMORRO ATC, am scribing for and in the presence of Dr. Chepe Macario. 10/17/22 5:21 PM Chery Velez ATC  I attest that I met personally with the patient, performed the described exam, reviewed the radiographic studies and medical records associated with this patient and supervised the services that are described above.      Mabel Savage MD

## 2022-10-20 ENCOUNTER — TELEPHONE (OUTPATIENT)
Dept: ORTHOPEDIC SURGERY | Age: 57
End: 2022-10-20

## 2022-10-20 RX ORDER — CELECOXIB 200 MG/1
200 CAPSULE ORAL DAILY
Qty: 60 CAPSULE | Refills: 3 | Status: SHIPPED | OUTPATIENT
Start: 2022-10-20

## 2022-10-20 NOTE — TELEPHONE ENCOUNTER
Prescription Refill     [facility]  [facility]  Ph[facility]   Patient Contact Number:  [phone]    THE PATIENT CALL AND STATED THAT HER PRESCRIPTION WAS SENT TO THE WRONG PHARMACY SHE WOULD LIKE FOR IT TO GO TO THE PHARMACY THAT I JUST PUT IN . PLEASE ADVISE.

## 2022-11-08 ENCOUNTER — HOSPITAL ENCOUNTER (OUTPATIENT)
Dept: PHYSICAL THERAPY | Age: 57
Setting detail: THERAPIES SERIES
Discharge: HOME | End: 2022-11-08
Payer: COMMERCIAL

## 2022-11-08 PROCEDURE — 97161 PT EVAL LOW COMPLEX 20 MIN: CPT | Performed by: PHYSICAL THERAPIST

## 2022-11-08 PROCEDURE — 97110 THERAPEUTIC EXERCISES: CPT | Performed by: PHYSICAL THERAPIST

## 2022-11-08 NOTE — FLOWSHEET NOTE
[facility] and [facility], [place], [place] [address], [address]  Phone: [phone]  Fax [phone]      Physical Therapy Treatment Note/ Progress Report:         Date:  2022    Patient Name:  Cate Walden    :  1965  MRN: [id number]  Restrictions/Precautions:    Medical/Treatment Diagnosis Information:  Diagnosis: M16.11 (ICD-10-CM) - Primary osteoarthritis of right hip,M76.01 (ICD-10-CM) - Gluteal tendinitis, right hip,S83.282A (ICD-10-CM) - Tear of lateral meniscus of left knee, unspecified tear type, unspecified whether old or current tear, initial encounter     Insurance/Certification information:  PT Insurance Information: [employer], 25 $ copay, 75 visits no auth  Physician Information:   Dr Robyn Joshua  Has the plan of care been signed (Y/N):        []  Yes  [x]  No     Date of Patient follow up with Physician: none scheduled 22      Is this a Progress Report:     []  Yes  [x]  No        If Yes:  Date Range for reporting period:  Beginnin22  Ending    Progress report will be due (10 Rx or 30 days whichever is less): 43       Recertification will be due (POC Duration  / 90 days whichever is less): 22      Visit # Insurance Allowable Auth Required   In-person 1 75 []  Yes []  No    Telehealth   []  Yes []  No    Total          Functional Scale: FOTO 99    Date assessed:  22         Number of Comorbidities:  []0     [x]1-2    []3+    Latex Allergy:  [x]NO      []YES  Preferred Language for Healthcare:   [x]English       []other:      Pain level:  4-6/10     SUBJECTIVE:  See eval    OBJECTIVE: See eval  Observation:   Test measurements:      RESTRICTIONS/PRECAUTIONS: none    Exercises/Interventions:     Therapeutic Ex (51456) Sets/sec/reps Notes/CUES   bridges x15 HEP   Supine clam B, R,L Blue x10 ea HEP   TB ankle PF Blue x10 HEP             Standing gastroc S 3x30\" R/L HEP   Foam roller to HS/gastroc PT demo HEP                            Manual Intervention (11800)                                   NMR re-education (61077)  CUES NEEDED                                                Therapeutic Activity (46292)                                   Pt Ed: eval findings, POC, HEP, foam rolling x7'      Therapeutic Exercise and NMR EXR  [x] (15486) Provided verbal/tactile cueing for activities related to strengthening, flexibility, endurance, ROM for improvements in LE, proximal hip, and core control with self care, mobility, lifting, ambulation.  [] (95839) Provided verbal/tactile cueing for activities related to improving balance, coordination, kinesthetic sense, posture, motor skill, proprioception  to assist with LE, proximal hip, and core control in self care, mobility, lifting, ambulation and eccentric single leg control. NMR and Therapeutic Activities:    [] (22279 or 46421) Provided verbal/tactile cueing for activities related to improving balance, coordination, kinesthetic sense, posture, motor skill, proprioception and motor activation to allow for proper function of core, proximal hip and LE with self care and ADLs  [] (92329) Gait Re-education- Provided training and instruction to the patient for proper LE, core and proximal hip recruitment and positioning and eccentric body weight control with ambulation re-education including up and down stairs     Home Exercise Program:    [x] (39268) Reviewed/Progressed HEP activities related to strengthening, flexibility, endurance, ROM of core, proximal hip and LE for functional self-care, mobility, lifting and ambulation/stair navigation   [] (79210)Reviewed/Progressed HEP activities related to improving balance, coordination, kinesthetic sense, posture, motor skill, proprioception of core, proximal hip and LE for self care, mobility, lifting, and ambulation/stair navigation      HEP instruction: Access Code: [email or web address]  URL: [email or web address]  Date: 11/08/2022  Prepared by: Josef Mullet     Exercises  Supine Bridge - 1 x daily - 7 x weekly - 2-3 sets - 10 reps  Hooklying Clamshell with Resistance - 1 x daily - 7 x weekly - 1-2 sets - 10 reps  Standing Gastroc Stretch - 1-2 x daily - 7 x weekly - 3 sets - 30 seconds hold  Long Sitting Ankle Plantar Flexion with Resistance - 1 x daily - 7 x weekly - 2-3 sets - 10 reps  Hamstring Mobilization with Foam Roll - 1 x daily - 7 x weekly - 3 sets - 10 reps  Calf Mobilization on Foam Roll - 1 x daily - 7 x weekly - 3 sets - 10 reps    Manual Treatments:  PROM / STM / Oscillations-Mobs:  G-I, II, III, IV (PA's, Inf., Post.)  [] (96765) Provided manual therapy to mobilize LE, proximal hip and/or LS spine soft tissue/joints for the purpose of modulating pain, promoting relaxation,  increasing ROM, reducing/eliminating soft tissue swelling/inflammation/restriction, improving soft tissue extensibility and allowing for proper ROM for normal function with self care, mobility, lifting and ambulation. Modalities:     [] GAME READY (VASO)- for significant edema, swelling, pain control. Charges  Timed Code Treatment Minutes: 23   Total Treatment Minutes: 50     [x] EVAL (LOW) 75858   [] EVAL (MOD) 30522  [] EVAL (HIGH) 72517   [] RE-EVAL     [x] TK(33230) x2     [] IONTO  [] NMR (10800) x     [] VASO  [] Manual (29287) x      [] Other:  [] TA x      [] Mech Traction (85652)  [] ES(attended) (46432)      [] ES (un) (04696):       GOALS:  Patient stated goal: Be able to keep up with my exercises to have lasting relief from pain  [] Progressing: [] Met: [] Not Met: [] Adjusted    Therapist goals for Patient:   Short Term Goals: To be achieved in: 2 weeks  1. Independent in HEP and progression per patient tolerance, in order to prevent re-injury. [] Progressing: [] Met: [] Not Met: [] Adjusted  2.  Patient will have a decrease in pain to facilitate improvement in movement, function, and ADLs as indicated by Functional Deficits. [] Progressing: [] Met: [] Not Met: [] Adjusted    Long Term Goals: To be achieved in: 6 weeks  1. Disability index score of 100% or better for the FOTO hip/knee to assist with reaching prior level of function. [] Progressing: [] Met: [] Not Met: [] Adjusted  2. Patient will demonstrate increased AROM to ankle DF to 10 deg to allow for proper joint functioning with walking and squatting. [] Progressing: [] Met: [] Not Met: [] Adjusted  3. Patient will demonstrate an increase in Strength to 5/5 B HS and grossly4+/5 B glute med/max to allow for proper functional mobility with squatting and lifting laundry basket. [] Progressing: [] Met: [] Not Met: [] Adjusted  4. Patient will good squat mechanics with even WBing x10 reps. [] Progressing: [] Met: [] Not Met: [] Adjusted    Progression Towards Functional goals:  [] Patient is progressing as expected towards functional goals listed. [] Progression is slowed due to complexities listed. [] Progression has been slowed due to co-morbidities. [x] Plan just implemented, too soon to assess goals progression  [] Other:         Overall Progression Towards Functional goals/ Treatment Progress Update:  [] Patient is progressing as expected towards functional goals listed. [] Progression is slowed due to complexities/Impairments listed. [] Progression has been slowed due to co-morbidities.   [x] Plan just implemented, too soon to assess goals progression <30days   [] Goals require adjustment due to lack of progress  [] Patient is not progressing as expected and requires additional follow up with physician  [] Other    Prognosis for POC: [x] Good [] Fair  [] Poor      Patient requires continued skilled intervention: [x] Yes  [] No    Treatment/Activity Tolerance:  [x] Patient able to complete treatment  [] Patient limited by fatigue  [] Patient limited by pain    [] Patient limited by other medical complications  [] Other:     ASSESSMENT: see eval    Return to Play: (if applicable)   []  Stage 1: Intro to Strength   []  Stage 2: Return to Run and Strength   []  Stage 3: Return to Jump and Strength   []  Stage 4: Dynamic Strength and Agility   []  Stage 5: Sport Specific Training     []  Ready to Return to Play, Meets All Above Stages   []  Not Ready for Return to Sports   Comments:                         PLAN: See eval  [] Continue per plan of care [] Alter current plan (see comments above)  [x] Plan of care initiated [] Hold pending MD visit [] Discharge      Electronically signed by:  Libby Infante PT    Note: If patient does not return for scheduled/ recommended follow up visits, this note will serve as a discharge from care along with most recent update on progress.

## 2022-11-08 NOTE — PLAN OF CARE
The [facility] and [facility], [place], [place] [address], [address]  Phone: [phone]  Fax [phone]     Physical Therapy Certification    Dear  Dr Stefani Rivera,    We had the pleasure of evaluating the following patient for physical therapy services at [address] [place] [address]. A summary of our findings can be found in the initial assessment below. This includes our plan of care. If you have any questions or concerns regarding these findings, please do not hesitate to contact me at the office phone number checked above. Thank you for the referral.       Physician Signature:_______________________________Date:__________________  By signing above (or electronic signature), therapists plan is approved by physician    Patient: Isabella Fry   : 1965   MRN: [id number]  Referring Physician:  Dr Stefani Rivera      Evaluation Date: 2022      Medical Diagnosis Information:  Diagnosis: M16.11 (ICD-10-CM) - Primary osteoarthritis of right hip,M76.01 (ICD-10-CM) - Gluteal tendinitis, right hip,S83.282A (ICD-10-CM) - Tear of lateral meniscus of left knee, unspecified tear type, unspecified whether old or current tear, initial encounter    L knee pain, R hip pain, weakness                 Insurance information: PT Insurance Information: [facility], 25 $ copay, 75 visits no auth       Precautions/ Contra-indications: none    C-SSRS Triggered by Intake questionnaire (Past 2 wk assessment):   [x] No, Questionnaire did not trigger screening.   [] Yes, Patient intake triggered further evaluation      [] C-SSRS Screening completed  [] PCP notified via Plan of Care  [] Emergency services notified     Latex Allergy:  [x]NO      []YES  Preferred Language for Healthcare:   [x]English       []other:    SUBJECTIVE: Patient stated pain started labor day. Had injection in L glute.     Relevant Medical History: n/a  Functional Disability Index: FOTO 99, risk adjusted 54    Height:5'4'' Weight: 188  Pain Scale: 4/10 (6 worst)  Easing factors: ice/heat, laying down  Provocative factors: no pattern noted (can go days with it being fine)    Type: []Constant   [x]Intermittent  []Radiating []Localized []other:     Numbness/Tingling: pulling behind knee    Occupation/School:  for school    Living Status/Prior Level of Function: Independent with ADLs and IADLs, walked 4 miles a day 3x a week previously, stopped after incident     OBJECTIVE:     Strength LEFT RIGHT   HIP Flex 4/5 4-/5pinch   HIP Abd 4-/5 pain 4+/5   HIP Ext 4/5 3+/5   HIP IR 4/5 pain 5/5   HIP ER 4-/5 4-/5   Knee ext 5/5 5/5 crepitus   Knee Flex 4-/5 pain 4+/5   Ankle PF      Ankle DF (knee extended)     Ankle DF (knee flexed)     Ankle In     Ankle Ev     ROM LEFT RIGHT   HIP Flexors WFL 90/90 WFL 90/90   HIP Abductors     HIP Ext     Hip ER  pain passive   Knee EXT  0 0   Knee Flex  121° 128°   Ankle DF 3° 7°   Ankle PF     Ankle Inv     Ankle EV     Great toe flexion     Great toe extension     Lesser toes flexion     Lesser toes extension          Circumference  Mid apex  7 cm prox             Reflexes/Sensation: NT   []Dermatomes/Myotomes intact    [x]Reflexes equal and normal bilaterally   []Other:    Joint mobility:    [x]Normal L patella   []Hypo   []Hyper    Palpation: L lateral jackie tender, L hamstring tendons tender, tenderness on outside/upper great troch area R    Functional Mobility/Transfers: good knee alignment with squat with R WB preference    Posture: lean with R SL balance    Bandages/Dressings/Incisions: n/a    Gait: (include devices/WB status) no AD    Orthopedic Special Tests: + FADIR R, + figure 4 SINDY R, bilat tight quads, -McMurrays                       [x] Patient history, allergies, meds reviewed. Medical chart reviewed. See intake form.      Review Of Systems (ROS):  [x]Performed Review of systems (Integumentary, CardioPulmonary, Neurological) by intake and observation. Intake form has been scanned into medical record. Patient has been instructed to contact their primary care physician regarding ROS issues if not already being addressed at this time. Co-morbidities/Complexities (which will affect course of rehabilitation):   []None           Arthritic conditions   []Rheumatoid arthritis (M05.9)  [x]Osteoarthritis (M19.91)   Cardiovascular conditions   [x]Hypertension (I10)  [x]Hyperlipidemia (E78.5)  []Angina pectoris (I20)  []Atherosclerosis (I70)   Musculoskeletal conditions   []Disc pathology   []Congenital spine pathologies   []Prior surgical intervention  []Osteoporosis (M81.8)  []Osteopenia (M85.8)   Endocrine conditions   []Hypothyroid (E03.9)  []Hyperthyroid Gastrointestinal conditions   []Constipation (E45.90)   Metabolic conditions   []Morbid obesity (E66.01)  []Diabetes type 1(E10.65) or 2 (E11.65)   []Neuropathy (G60.9)     Pulmonary conditions   []Asthma (J45)  []Coughing   []COPD (J44.9)   Psychological Disorders  []Anxiety (F41.9)  []Depression (F32.9)   []Other:   []Other:          Barriers to/and or personal factors that will affect rehab potential:              []Age  []Sex              []Motivation/Lack of Motivation                        []Co-Morbidities              []Cognitive Function, education/learning barriers              []Environmental, home barriers              []profession/work barriers  []past PT/medical experience  []other:  Justification:     Falls Risk Assessment (30 days):   [x] Falls Risk assessed and no intervention required.   [] Falls Risk assessed and Patient requires intervention due to being higher risk   TUG score (>12s at risk):     [] Falls education provided, including           ASSESSMENT:   Functional Impairments:     []Noted lumbar/proximal hip/LE joint hypomobility   [x]Decreased LE functional ROM   [x]Decreased core/proximal hip strength and neuromuscular control   [x]Decreased LE functional strength []Reduced balance/proprioceptive control   []other:      Functional Activity Limitations (from functional questionnaire and intake)   [x]Reduced ability to tolerate prolonged functional positions   [x]Reduced ability or difficulty with changes of positions or transfers between positions   [x]Reduced ability to maintain good posture and demonstrate good body mechanics with sitting, bending, and lifting   [x]Reduced ability to sleep   [] Reduced ability or tolerance with driving and/or computer work   []Reduced ability to perform lifting, carrying tasks   [x]Reduced ability to squat   []Reduced ability to forward bend   []Reduced ability to ambulate prolonged functional periods/distances/surfaces   []Reduced ability to ascend/descend stairs   [x]Reduced ability to run, hop, cut or jump   []other:    Participation Restrictions   []Reduced participation in self care activities   [x]Reduced participation in home management activities   []Reduced participation in work activities   [x]Reduced participation in social activities. []Reduced participation in sport/recreation activities. Classification :    []Signs/symptoms consistent with post-surgical status including decreased ROM, strength and function.    []Signs/symptoms consistent with joint sprain/strain   []Signs/symptoms consistent with patella-femoral syndrome   [x]Signs/symptoms consistent with knee OA/hip OA   [x]Signs/symptoms consistent with internal derangement of knee/Hip   [x]Signs/symptoms consistent with functional hip weakness/NMR control      []Signs/symptoms consistent with tendinitis/tendinosis    [x]signs/symptoms consistent with pathology which may benefit from Dry needling      []other:      Prognosis/Rehab Potential:      []Excellent   [x]Good    []Fair   []Poor    Tolerance of evaluation/treatment:    []Excellent   [x]Good    []Fair   []Poor  Physical Therapy Evaluation Complexity Justification  [x] A history of present problem with:  [] no personal factors and/or comorbidities that impact the plan of care;  [x]1-2 personal factors and/or comorbidities that impact the plan of care  []3 personal factors and/or comorbidities that impact the plan of care  [x] An examination of body systems using standardized tests and measures addressing any of the following: body structures and functions (impairments), activity limitations, and/or participation restrictions;:  [x] a total of 1-2 or more elements   [] a total of 3 or more elements   [] a total of 4 or more elements   [x] A clinical presentation with:  [x] stable and/or uncomplicated characteristics   [] evolving clinical presentation with changing characteristics  [] unstable and unpredictable characteristics;   [x] Clinical decision making of [x] low, [] moderate, [] high complexity using standardized patient assessment instrument and/or measurable assessment of functional outcome. [x] EVAL (LOW) 67823 (typically 20 minutes face-to-face)  [] EVAL (MOD) 83503 (typically 30 minutes face-to-face)  [] EVAL (HIGH) 15941 (typically 45 minutes face-to-face)  [] RE-EVAL       PLAN:   Frequency/Duration:  1-2 days per week for 6 Weeks:  Interventions:  [x]  Therapeutic exercise including: strength training, ROM, for Lower extremity and core   [x]  NMR activation and proprioception for LE, Glutes and Core   [x]  Manual therapy as indicated for LE, Hip and spine to include: Dry Needling/IASTM, STM, PROM, Gr I-IV mobilizations, manipulation. [x] Modalities as needed that may include: thermal agents, E-stim, Biofeedback, US, iontophoresis as indicated  [x] Patient education on joint protection, postural re-education, activity modification, progression of HEP. HEP instruction: Access Code: [email or web address]  URL: [email or web address]  Date: 11/08/2022  Prepared by: Amalia Walker    Exercises  Supine Bridge - 1 x daily - 7 x weekly - 2-3 sets - 10 reps  Hooklying Clamshell with Resistance - 1 x daily - 7 x weekly - 1-2 sets - 10 reps  Standing Gastroc Stretch - 1-2 x daily - 7 x weekly - 3 sets - 30 seconds hold  Long Sitting Ankle Plantar Flexion with Resistance - 1 x daily - 7 x weekly - 2-3 sets - 10 reps  Hamstring Mobilization with Foam Roll - 1 x daily - 7 x weekly - 3 sets - 10 reps  Calf Mobilization on Foam Roll - 1 x daily - 7 x weekly - 3 sets - 10 reps      GOALS:  Patient stated goal: Be able to keep up with my exercises to have lasting relief from pain  [] Progressing: [] Met: [] Not Met: [] Adjusted    Therapist goals for Patient:   Short Term Goals: To be achieved in: 2 weeks  1. Independent in HEP and progression per patient tolerance, in order to prevent re-injury. [] Progressing: [] Met: [] Not Met: [] Adjusted  2. Patient will have a decrease in pain to facilitate improvement in movement, function, and ADLs as indicated by Functional Deficits. [] Progressing: [] Met: [] Not Met: [] Adjusted    Long Term Goals: To be achieved in: 6 weeks  1. Disability index score of 100% or better for the FOTO hip/knee to assist with reaching prior level of function. [] Progressing: [] Met: [] Not Met: [] Adjusted  2. Patient will demonstrate increased AROM to ankle DF to 10 deg to allow for proper joint functioning with walking and squatting. [] Progressing: [] Met: [] Not Met: [] Adjusted  3. Patient will demonstrate an increase in Strength to 5/5 B HS and grossly4+/5 B glute med/max to allow for proper functional mobility with squatting and lifting laundry basket. [] Progressing: [] Met: [] Not Met: [] Adjusted  4. Patient will good squat mechanics with even WBing x10 reps. [] Progressing: [] Met: [] Not Met: [] Adjusted       Electronically signed by:  BASSAM Valentino SPT Therapist was present, directed the patient's care, made skilled judgement, and was responsible for assessment and treatment of the patient.

## 2022-11-15 ENCOUNTER — HOSPITAL ENCOUNTER (OUTPATIENT)
Dept: PHYSICAL THERAPY | Age: 57
Setting detail: THERAPIES SERIES
Discharge: HOME | End: 2022-11-15
Payer: COMMERCIAL

## 2022-11-15 NOTE — FLOWSHEET NOTE
[facility] and [address] [place] [address], [address], [address]  Phone: [phone]  Fax [phone]    Physical Therapy  Cancellation/No-show Note  Patient Name:  Caleb Benitez  :  1965   Date:  11/15/2022  Cancelled visits to date: 0  No-shows to date: 1    Patient status for today's appointment patient:  []  Cancelled  []  Rescheduled appointment  [x]  No-show 11/15/22     Reason given by patient:  []  Patient ill  []  Conflicting appointment  []  No transportation    []  Conflict with work  [x]  No reason given  []  Other:     Comments:      Phone call information:   []  Phone call made today to patient at _ time at number provided:      []  Patient answered, conversation as follows:    []  Patient did not answer, message left as follows:  []  Phone call not made today    Electronically signed by:  Ninfa Osorio PT

## 2022-11-21 ENCOUNTER — HOSPITAL ENCOUNTER (OUTPATIENT)
Dept: PHYSICAL THERAPY | Age: 57
Setting detail: THERAPIES SERIES
Discharge: HOME | End: 2022-11-21
Payer: COMMERCIAL

## 2022-11-21 PROCEDURE — 97530 THERAPEUTIC ACTIVITIES: CPT | Performed by: PHYSICAL THERAPIST

## 2022-11-21 PROCEDURE — 97110 THERAPEUTIC EXERCISES: CPT | Performed by: PHYSICAL THERAPIST

## 2022-11-21 PROCEDURE — 97016 VASOPNEUMATIC DEVICE THERAPY: CPT | Performed by: PHYSICAL THERAPIST

## 2022-11-21 NOTE — FLOWSHEET NOTE
The [facility] and [address], [address]  Phone: [phone]  Fax [phone]      Physical Therapy Treatment Note/ Progress Report:         Date:  2022    Patient Name:  Katerin Mcarthur    :  1965  MRN: [id number]  Restrictions/Precautions:    Medical/Treatment Diagnosis Information:  Diagnosis: M16.11 (ICD-10-CM) - Primary osteoarthritis of right hip,M76.01 (ICD-10-CM) - Gluteal tendinitis, right hip,S83.282A (ICD-10-CM) - Tear of lateral meniscus of left knee, unspecified tear type, unspecified whether old or current tear, initial encounter     Insurance/Certification information:  PT Insurance Information: BCBS, 25 $ copay, 75 visits no auth  Physician Information:   Dr Parris Goltz  Has the plan of care been signed (Y/N):        []  Yes  [x]  No     Date of Patient follow up with Physician: none scheduled 22      Is this a Progress Report:     []  Yes  [x]  No        If Yes:  Date Range for reporting period:  Beginnin22  Ending    Progress report will be due (10 Rx or 30 days whichever is less): 76/3/52       Recertification will be due (POC Duration  / 90 days whichever is less): 22      Visit # Insurance Allowable Auth Required   In-person 2 75 []  Yes []  No    Telehealth   []  Yes []  No    Total          Functional Scale: FOTO 99    Date assessed:  22         Number of Comorbidities:  []0     [x]1-2    []3+    Latex Allergy:  [x]NO      []YES  Preferred Language for Healthcare:   [x]English       []other:      Pain level:  2/10     SUBJECTIVE:  Celebrex. Ice. Pt c/o's tightness and swelling in the left knee.      OBJECTIVE:   Observation:   Test measurements:      RESTRICTIONS/PRECAUTIONS: none    Exercises/Interventions:  Left knee and right hip    Therapeutic Ex (58506) Sets/sec/reps Notes/CUES   bridges HEP   Supine clam B, R,L HEP   TB ankle PF HEP       Incline stretch [id number]    Standing gastroc S HEP   Foam roller to HS/gastroc HEP   HS stretch [id number]    QS [id number]    SLR flexion 3x10    Abduction  3x10    LAQ 2# 3x10    Manual Intervention (41873)                                   NMR re-education (05357)  CUES NEEDED                                                Therapeutic Activity (83975)     Wall sit Next visit    HS curl standing 0# 3x10    Calf raises HEP    Ball squeeze HEP              Patient Education:        Therapeutic Exercise and NMR EXR  [x] (84707) Provided verbal/tactile cueing for activities related to strengthening, flexibility, endurance, ROM for improvements in LE, proximal hip, and core control with self care, mobility, lifting, ambulation.  [] (73078) Provided verbal/tactile cueing for activities related to improving balance, coordination, kinesthetic sense, posture, motor skill, proprioception  to assist with LE, proximal hip, and core control in self care, mobility, lifting, ambulation and eccentric single leg control.      NMR and Therapeutic Activities:    [x] (65360 or 87436) Provided verbal/tactile cueing for activities related to improving balance, coordination, kinesthetic sense, posture, motor skill, proprioception and motor activation to allow for proper function of core, proximal hip and LE with self care and ADLs  [] (89914) Gait Re-education- Provided training and instruction to the patient for proper LE, core and proximal hip recruitment and positioning and eccentric body weight control with ambulation re-education including up and down stairs     Home Exercise Program:    [x] (19072) Reviewed/Progressed HEP activities related to strengthening, flexibility, endurance, ROM of core, proximal hip and LE for functional self-care, mobility, lifting and ambulation/stair navigation   [x] (25471)Reviewed/Progressed HEP activities related to improving balance, coordination, kinesthetic sense, posture, motor skill, proprioception of core, proximal hip and LE for self care, mobility, lifting, and ambulation/stair navigation      HEP instruction:   Access Code: [email or web address]  URL: [email or web address]  Date: 11/21/2022  Prepared by: Corey Bare    Exercises  Supine Bridge - 1 x daily - 7 x weekly - 2-3 sets - 10 reps  Hooklying Clamshell with Resistance - 1 x daily - 7 x weekly - 1-2 sets - 10 reps  Standing Gastroc Stretch - 1-2 x daily - 7 x weekly - 3 sets - 30 seconds hold  Long Sitting Ankle Plantar Flexion with Resistance - 1 x daily - 7 x weekly - 2-3 sets - 10 reps  Hamstring Mobilization with Foam Roll - 1 x daily - 7 x weekly - 3 sets - 10 reps  Calf Mobilization on Foam Roll - 1 x daily - 7 x weekly - 3 sets - 10 reps  Seated Table Hamstring Stretch - 1 x daily - 7 x weekly - 5 reps - 30 sec hold  Long Sitting Quad Set - 1 x daily - 7 x weekly - 1 sets - 10 reps - 10 sec hold  Active Straight Leg Raise with Quad Set (Mirrored) - 1 x daily - 7 x weekly - 3 sets - 10 reps  Sidelying Hip Abduction - 1 x daily - 7 x weekly - 3 sets - 10 reps  Seated Long Arc Quad (Mirrored) - 1 x daily - 7 x weekly - 3 sets - 10 reps  Standing Knee Flexion AROM with Chair Support - 1 x daily - 7 x weekly - 3 sets - 10 reps  Heel Raises with Counter Support - 1 x daily - 7 x weekly - 3 sets - 10 reps  Supine Hip Adduction Isometric with Ball - 1 x daily - 7 x weekly - 1 sets - 10 reps - 10sec hold    Manual Treatments:  PROM / STM / Oscillations-Mobs:  G-I, II, III, IV (PA's, Inf., Post.)  [] (07436) Provided manual therapy to mobilize LE, proximal hip and/or LS spine soft tissue/joints for the purpose of modulating pain, promoting relaxation,  increasing ROM, reducing/eliminating soft tissue swelling/inflammation/restriction, improving soft tissue extensibility and allowing for proper ROM for normal function with self care, mobility, lifting and ambulation. Modalities:     [x] GAME READY (VASO)- for significant edema, swelling, pain control.      Charges  Timed Code Treatment Minutes: 40   Total Treatment Minutes: 55     [] EVAL (LOW) 91535   [] EVAL (MOD) 72864  [] EVAL (HIGH) 52812   [] RE-EVAL     [x] PA(14847) x2     [] IONTO  [] NMR (59376) x     [x] VASO  [] Manual (84263) x      [] Other:  [x] TA x  1    [] Mech Traction (21639)  [] ES(attended) (46391)      [] ES (un) (90864):       GOALS:  Patient stated goal: Be able to keep up with my exercises to have lasting relief from pain  [] Progressing: [] Met: [] Not Met: [] Adjusted    Therapist goals for Patient:   Short Term Goals: To be achieved in: 2 weeks  1. Independent in HEP and progression per patient tolerance, in order to prevent re-injury. [] Progressing: [] Met: [] Not Met: [] Adjusted  2. Patient will have a decrease in pain to facilitate improvement in movement, function, and ADLs as indicated by Functional Deficits. [] Progressing: [] Met: [] Not Met: [] Adjusted    Long Term Goals: To be achieved in: 6 weeks  1. Disability index score of 100% or better for the FOTO hip/knee to assist with reaching prior level of function. [] Progressing: [] Met: [] Not Met: [] Adjusted  2. Patient will demonstrate increased AROM to ankle DF to 10 deg to allow for proper joint functioning with walking and squatting. [] Progressing: [] Met: [] Not Met: [] Adjusted  3. Patient will demonstrate an increase in Strength to 5/5 B HS and grossly4+/5 B glute med/max to allow for proper functional mobility with squatting and lifting laundry basket. [] Progressing: [] Met: [] Not Met: [] Adjusted  4. Patient will good squat mechanics with even WBing x10 reps. [] Progressing: [] Met: [] Not Met: [] Adjusted    Progression Towards Functional goals:  [] Patient is progressing as expected towards functional goals listed. [] Progression is slowed due to complexities listed. [] Progression has been slowed due to co-morbidities.   [x] Plan just implemented, too soon to assess goals progression  [] Other:         Overall Progression Towards Functional goals/ Treatment Progress Update:  [] Patient is progressing as expected towards functional goals listed. [] Progression is slowed due to complexities/Impairments listed. [] Progression has been slowed due to co-morbidities. [x] Plan just implemented, too soon to assess goals progression <30days   [] Goals require adjustment due to lack of progress  [] Patient is not progressing as expected and requires additional follow up with physician  [] Other    Prognosis for POC: [x] Good [] Fair  [] Poor      Patient requires continued skilled intervention: [x] Yes  [] No    Treatment/Activity Tolerance:  [x] Patient able to complete treatment  [x] Patient limited by fatigue  [x] Patient limited by pain    [] Patient limited by other medical complications  [] Other:     ASSESSMENT:     Pt tolerated the exercises well. She has swelling and decreased strength in the left knee. Continue to advance therapy exercises as tolerated. PLAN: Stretching, strengthening program.   [x] Continue per plan of care [] Alter current plan (see comments above)  [] Plan of care initiated [] Hold pending MD visit [] Discharge      Electronically signed by:  Darrian Mane, PT    Note: If patient does not return for scheduled/ recommended follow up visits, this note will serve as a discharge from care along with most recent update on progress.

## 2022-11-28 ENCOUNTER — HOSPITAL ENCOUNTER (OUTPATIENT)
Dept: PHYSICAL THERAPY | Age: 57
Setting detail: THERAPIES SERIES
Discharge: HOME | End: 2022-11-28
Payer: COMMERCIAL

## 2022-11-28 PROCEDURE — 97530 THERAPEUTIC ACTIVITIES: CPT | Performed by: PHYSICAL THERAPIST

## 2022-11-28 PROCEDURE — 97110 THERAPEUTIC EXERCISES: CPT | Performed by: PHYSICAL THERAPIST

## 2022-11-28 PROCEDURE — 97016 VASOPNEUMATIC DEVICE THERAPY: CPT | Performed by: PHYSICAL THERAPIST

## 2022-11-28 NOTE — FLOWSHEET NOTE
The [address] [facility] and [address], [address] [place] [address], [facility] [address]  Phone: [phone]  Fax [phone]      Physical Therapy Treatment Note/ Progress Report:         Date:  2022    Patient Name:  Shelly Puri    :  1965  MRN: [id number]  Restrictions/Precautions:    Medical/Treatment Diagnosis Information:  Diagnosis: M16.11 (ICD-10-CM) - Primary osteoarthritis of right hip,M76.01 (ICD-10-CM) - Gluteal tendinitis, right hip,S83.282A (ICD-10-CM) - Tear of lateral meniscus of left knee, unspecified tear type, unspecified whether old or current tear, initial encounter     Insurance/Certification information:  PT Insurance Information: BCBS, 25 $ copay, 75 visits no auth  Physician Information:   Dr Dione Blas  Has the plan of care been signed (Y/N):        []  Yes  [x]  No     Date of Patient follow up with Physician: none scheduled 22      Is this a Progress Report:     []  Yes  [x]  No        If Yes:  Date Range for reporting period:  Beginnin22  Ending    Progress report will be due (10 Rx or 30 days whichever is less):        Recertification will be due (POC Duration  / 90 days whichever is less): 22      Visit # Insurance Allowable Auth Required   In-person 3 75 []  Yes []  No    Telehealth   []  Yes []  No    Total          Functional Scale: FOTO 99    Date assessed:  22         Number of Comorbidities:  []0     [x]1-2    []3+    Latex Allergy:  [x]NO      []YES  Preferred Language for Healthcare:   [x]English       []other:      Pain level:  2/10     SUBJECTIVE:  Pt has noticed slight improvement in the knee. Less swelling today.      OBJECTIVE:   Observation:   Test measurements:      RESTRICTIONS/PRECAUTIONS: none    Exercises/Interventions:  Left knee and right hip    Therapeutic Ex (36449) Sets/sec/reps Notes/CUES   bridges 1x10    Supine clam B, R,L    TB ankle PF        Incline stretch [id number] Standing gastroc S    Foam roller to HS/gastroc    HS stretch [id number]    QS    SLR flexion 1# 2x10    Abduction  1# 2x10    LAQ    Manual Intervention (28926)                                   NMR re-education (14821)  CUES NEEDED                                                Therapeutic Activity (66098)     Wall sit [id number]    HS curl standing    Calf raises HEP    Ball squeeze [id number]    Leg extension  10# 3x10    Leg curl 25# 2x10    Patient Education:        Therapeutic Exercise and NMR EXR  [x] (19881) Provided verbal/tactile cueing for activities related to strengthening, flexibility, endurance, ROM for improvements in LE, proximal hip, and core control with self care, mobility, lifting, ambulation.  [] (94733) Provided verbal/tactile cueing for activities related to improving balance, coordination, kinesthetic sense, posture, motor skill, proprioception  to assist with LE, proximal hip, and core control in self care, mobility, lifting, ambulation and eccentric single leg control.      NMR and Therapeutic Activities:    [x] (52406 or 97796) Provided verbal/tactile cueing for activities related to improving balance, coordination, kinesthetic sense, posture, motor skill, proprioception and motor activation to allow for proper function of core, proximal hip and LE with self care and ADLs  [] (68527) Gait Re-education- Provided training and instruction to the patient for proper LE, core and proximal hip recruitment and positioning and eccentric body weight control with ambulation re-education including up and down stairs     Home Exercise Program:    [x] (00534) Reviewed/Progressed HEP activities related to strengthening, flexibility, endurance, ROM of core, proximal hip and LE for functional self-care, mobility, lifting and ambulation/stair navigation   [x] (25606)Reviewed/Progressed HEP activities related to improving balance, coordination, kinesthetic sense, posture, motor skill, proprioception of core, proximal hip and LE for self care, mobility, lifting, and ambulation/stair navigation      HEP instruction:   Access Code: [email or web address]  URL: [email or web address]  Date: 11/21/2022  Prepared by: Kristian Diego    Exercises  Supine Bridge - 1 x daily - 7 x weekly - 2-3 sets - 10 reps  Hooklying Clamshell with Resistance - 1 x daily - 7 x weekly - 1-2 sets - 10 reps  Standing Gastroc Stretch - 1-2 x daily - 7 x weekly - 3 sets - 30 seconds hold  Long Sitting Ankle Plantar Flexion with Resistance - 1 x daily - 7 x weekly - 2-3 sets - 10 reps  Hamstring Mobilization with Foam Roll - 1 x daily - 7 x weekly - 3 sets - 10 reps  Calf Mobilization on Foam Roll - 1 x daily - 7 x weekly - 3 sets - 10 reps  Seated Table Hamstring Stretch - 1 x daily - 7 x weekly - 5 reps - 30 sec hold  Long Sitting Quad Set - 1 x daily - 7 x weekly - 1 sets - 10 reps - 10 sec hold  Active Straight Leg Raise with Quad Set (Mirrored) - 1 x daily - 7 x weekly - 3 sets - 10 reps  Sidelying Hip Abduction - 1 x daily - 7 x weekly - 3 sets - 10 reps  Seated Long Arc Quad (Mirrored) - 1 x daily - 7 x weekly - 3 sets - 10 reps  Standing Knee Flexion AROM with Chair Support - 1 x daily - 7 x weekly - 3 sets - 10 reps  Heel Raises with Counter Support - 1 x daily - 7 x weekly - 3 sets - 10 reps  Supine Hip Adduction Isometric with Ball - 1 x daily - 7 x weekly - 1 sets - 10 reps - 10sec hold    Manual Treatments:  PROM / STM / Oscillations-Mobs:  G-I, II, III, IV (PA's, Inf., Post.)  [] (00532) Provided manual therapy to mobilize LE, proximal hip and/or LS spine soft tissue/joints for the purpose of modulating pain, promoting relaxation,  increasing ROM, reducing/eliminating soft tissue swelling/inflammation/restriction, improving soft tissue extensibility and allowing for proper ROM for normal function with self care, mobility, lifting and ambulation. Modalities:     [x] GAME READY (VASO)- for significant edema, swelling, pain control. Charges  Timed Code Treatment Minutes: 45   Total Treatment Minutes: 60     [] EVAL (LOW) 72697   [] EVAL (MOD) 10239  [] EVAL (HIGH) 71107   [] RE-EVAL     [x] HV(08406) x2     [] IONTO  [] NMR (77782) x     [x] VASO  [] Manual (37157) x      [] Other:  [x] TA x  1    [] Mech Traction (93606)  [] ES(attended) (36809)      [] ES (un) (36068):       GOALS:  Patient stated goal: Be able to keep up with my exercises to have lasting relief from pain  [] Progressing: [] Met: [] Not Met: [] Adjusted    Therapist goals for Patient:   Short Term Goals: To be achieved in: 2 weeks  1. Independent in HEP and progression per patient tolerance, in order to prevent re-injury. [] Progressing: [] Met: [] Not Met: [] Adjusted  2. Patient will have a decrease in pain to facilitate improvement in movement, function, and ADLs as indicated by Functional Deficits. [] Progressing: [] Met: [] Not Met: [] Adjusted    Long Term Goals: To be achieved in: 6 weeks  1. Disability index score of 100% or better for the FOTO hip/knee to assist with reaching prior level of function. [] Progressing: [] Met: [] Not Met: [] Adjusted  2. Patient will demonstrate increased AROM to ankle DF to 10 deg to allow for proper joint functioning with walking and squatting. [] Progressing: [] Met: [] Not Met: [] Adjusted  3. Patient will demonstrate an increase in Strength to 5/5 B HS and grossly4+/5 B glute med/max to allow for proper functional mobility with squatting and lifting laundry basket. [] Progressing: [] Met: [] Not Met: [] Adjusted  4. Patient will good squat mechanics with even WBing x10 reps. [] Progressing: [] Met: [] Not Met: [] Adjusted    Progression Towards Functional goals:  [] Patient is progressing as expected towards functional goals listed. [] Progression is slowed due to complexities listed. [] Progression has been slowed due to co-morbidities.   [x] Plan just implemented, too soon to assess goals progression  [] Other:

## 2022-11-29 ENCOUNTER — APPOINTMENT (OUTPATIENT)
Dept: PHYSICAL THERAPY | Age: 57
End: 2022-11-29
Payer: COMMERCIAL

## 2022-12-06 ENCOUNTER — HOSPITAL ENCOUNTER (OUTPATIENT)
Dept: PHYSICAL THERAPY | Age: 57
Setting detail: THERAPIES SERIES
Discharge: HOME | End: 2022-12-06
Payer: COMMERCIAL

## 2022-12-06 PROCEDURE — 97110 THERAPEUTIC EXERCISES: CPT | Performed by: PHYSICAL THERAPIST

## 2022-12-06 NOTE — FLOWSHEET NOTE
[facility] and [address], [place] [address], [address]  Phone: [phone]  Fax [phone]      Physical Therapy Treatment Note/ Progress Report:         Date:  2022    Patient Name:  Cate Walden    :  1965  MRN: [id number]  Restrictions/Precautions:    Medical/Treatment Diagnosis Information:  Diagnosis: M16.11 (ICD-10-CM) - Primary osteoarthritis of right hip,M76.01 (ICD-10-CM) - Gluteal tendinitis, right hip,S83.282A (ICD-10-CM) - Tear of lateral meniscus of left knee, unspecified tear type, unspecified whether old or current tear, initial encounter     Insurance/Certification information:  PT Insurance Information: BCBS, 25 $ copay, 75 visits no auth  Physician Information:   Dr Robyn Joshua  Has the plan of care been signed (Y/N):        []  Yes  [x]  No     Date of Patient follow up with Physician:       Is this a Progress Report:     []  Yes  [x]  No        If Yes:  Date Range for reporting period:  Beginnin22  Ending    Progress report will be due (10 Rx or 30 days whichever is less):        Recertification will be due (POC Duration  / 90 days whichever is less): 22      Visit # Insurance Allowable Auth Required   In-person 4 75 []  Yes []  No    Telehealth   []  Yes []  No    Total          Functional Scale: FOTO 99    Date assessed:  22         Number of Comorbidities:  []0     [x]1-2    []3+    Latex Allergy:  [x]NO      []YES  Preferred Language for Healthcare:   [x]English       []other:      Pain level:  0/10     SUBJECTIVE:  I feel like it is better. My knee is 90 % better and I know my hip arthritis probably will not go away fully. It just goes off and on.     OBJECTIVE:   Observation: 17 in R, 18 in L swelling mid patella  Test measurements:      RESTRICTIONS/PRECAUTIONS: none    Exercises/Interventions:  Left knee and right hip    Therapeutic Ex (23391) Sets/sec/reps Notes/CUES bridges    Supine clam B, R,L    TB ankle PF    Reverse clamshells      Incline stretch    Standing gastroc S    Foam roller to HS/gastroc    HS stretch 3x30 sec R and L    QS [id number]    SLR flexion 1# 2x10    Abduction     LAQ 2# 3x10    Figure 4 stretch R 4x 15 sec    Manual Intervention (86057)                                   NMR re-education (41920)  CUES NEEDED   Squat with mat as destination 10x2                                            Therapeutic Activity (35113)     Wall sit    HS curl standing    Calf raises    Ball squeeze    Leg extension     Leg curl    Patient Education:        Therapeutic Exercise and NMR EXR  [x] (24455) Provided verbal/tactile cueing for activities related to strengthening, flexibility, endurance, ROM for improvements in LE, proximal hip, and core control with self care, mobility, lifting, ambulation.  [] (90847) Provided verbal/tactile cueing for activities related to improving balance, coordination, kinesthetic sense, posture, motor skill, proprioception  to assist with LE, proximal hip, and core control in self care, mobility, lifting, ambulation and eccentric single leg control.      NMR and Therapeutic Activities:    [x] (76325 or 11982) Provided verbal/tactile cueing for activities related to improving balance, coordination, kinesthetic sense, posture, motor skill, proprioception and motor activation to allow for proper function of core, proximal hip and LE with self care and ADLs  [] (53387) Gait Re-education- Provided training and instruction to the patient for proper LE, core and proximal hip recruitment and positioning and eccentric body weight control with ambulation re-education including up and down stairs     Home Exercise Program:    [x] (95653) Reviewed/Progressed HEP activities related to strengthening, flexibility, endurance, ROM of core, proximal hip and LE for functional self-care, mobility, lifting and ambulation/stair navigation   [x] (61043)Reviewed/Progressed HEP activities related to improving balance, coordination, kinesthetic sense, posture, motor skill, proprioception of core, proximal hip and LE for self care, mobility, lifting, and ambulation/stair navigation      HEP instruction:   Access Code: [email or web address]  URL: [email or web address]  Date: 11/21/2022  Prepared by: Akhil Jumbo    Exercises  Supine Bridge - 1 x daily - 7 x weekly - 2-3 sets - 10 reps  Hooklying Clamshell with Resistance - 1 x daily - 7 x weekly - 1-2 sets - 10 reps  Standing Gastroc Stretch - 1-2 x daily - 7 x weekly - 3 sets - 30 seconds hold  Long Sitting Ankle Plantar Flexion with Resistance - 1 x daily - 7 x weekly - 2-3 sets - 10 reps  Hamstring Mobilization with Foam Roll - 1 x daily - 7 x weekly - 3 sets - 10 reps  Calf Mobilization on Foam Roll - 1 x daily - 7 x weekly - 3 sets - 10 reps  Seated Table Hamstring Stretch - 1 x daily - 7 x weekly - 5 reps - 30 sec hold  Long Sitting Quad Set - 1 x daily - 7 x weekly - 1 sets - 10 reps - 10 sec hold  Active Straight Leg Raise with Quad Set (Mirrored) - 1 x daily - 7 x weekly - 3 sets - 10 reps  Sidelying Hip Abduction - 1 x daily - 7 x weekly - 3 sets - 10 reps  Seated Long Arc Quad (Mirrored) - 1 x daily - 7 x weekly - 3 sets - 10 reps  Standing Knee Flexion AROM with Chair Support - 1 x daily - 7 x weekly - 3 sets - 10 reps  Heel Raises with Counter Support - 1 x daily - 7 x weekly - 3 sets - 10 reps  Supine Hip Adduction Isometric with Ball - 1 x daily - 7 x weekly - 1 sets - 10 reps - 10sec hold    Manual Treatments:  PROM / STM / Oscillations-Mobs:  G-I, II, III, IV (PA's, Inf., Post.)  [] (16624) Provided manual therapy to mobilize LE, proximal hip and/or LS spine soft tissue/joints for the purpose of modulating pain, promoting relaxation,  increasing ROM, reducing/eliminating soft tissue swelling/inflammation/restriction, improving soft tissue extensibility and allowing for proper ROM for normal function with self care, mobility, lifting and ambulation. Modalities:     [x] Ice R hip and L knee    Charges  Timed Code Treatment Minutes: 45   Total Treatment Minutes: 60     [] EVAL (LOW) 30105   [] EVAL (MOD) 70643  [] EVAL (HIGH) 04229   [] RE-EVAL     [x] TA(69878) x3     [] IONTO  [] NMR (62950) x     [] VASO  [] Manual (95326) x      [] Other:  [] TA x     [] Mech Traction (94247)  [] ES(attended) (52534)      [] ES (un) (71232):       GOALS:  Patient stated goal: Be able to keep up with my exercises to have lasting relief from pain  [] Progressing: [x] Met: [] Not Met: [] Adjusted    Therapist goals for Patient:   Short Term Goals: To be achieved in: 2 weeks  1. Independent in HEP and progression per patient tolerance, in order to prevent re-injury. [] Progressing: [x] Met: [] Not Met: [] Adjusted  2. Patient will have a decrease in pain to facilitate improvement in movement, function, and ADLs as indicated by Functional Deficits. [] Progressing: [x] Met: [] Not Met: [] Adjusted    Long Term Goals: To be achieved in: 6 weeks  1. Disability index score of 100% or better for the FOTO hip/knee to assist with reaching prior level of function. [] Progressing: [] Met: [] Not Met: [] Adjusted  2. Patient will demonstrate increased AROM to ankle DF to 10 deg to allow for proper joint functioning with walking and squatting. [] Progressing: [] Met: [] Not Met: [] Adjusted  3. Patient will demonstrate an increase in Strength to 5/5 B HS and grossly4+/5 B glute med/max to allow for proper functional mobility with squatting and lifting laundry basket. [x] Progressing: [] Met: [] Not Met: [] Adjusted  4. Patient will good squat mechanics with even WBing x10 reps. [x] Progressing: [] Met: [] Not Met: [] Adjusted    Progression Towards Functional goals:  [x] Patient is progressing as expected towards functional goals listed. [] Progression is slowed due to complexities listed.   [] Progression has been slowed due to co-morbidities. [] Plan just implemented, too soon to assess goals progression  [] Other:         Overall Progression Towards Functional goals/ Treatment Progress Update:  [x] Patient is progressing as expected towards functional goals listed. [] Progression is slowed due to complexities/Impairments listed. [] Progression has been slowed due to co-morbidities. [] Plan just implemented, too soon to assess goals progression <30days   [] Goals require adjustment due to lack of progress  [] Patient is not progressing as expected and requires additional follow up with physician  [] Other    Prognosis for POC: [x] Good [] Fair  [] Poor      Patient requires continued skilled intervention: [x] Yes  [] No    Treatment/Activity Tolerance:  [x] Patient able to complete treatment  [] Patient limited by fatigue  [] Patient limited by pain    [] Patient limited by other medical complications  [] Other:     ASSESSMENT: Pt benefits from cues for glute activation on the way up from squats. Pt reports that knee pain is subsiding but still has swelling in knee and dec DF and hip pain occassionally. PLAN: Stretching, strengthening program.   [x] Continue per plan of care [] Alter current plan (see comments above)  [] Plan of care initiated [] Hold pending MD visit [] Discharge      Electronically signed by:  Danny Haque, PT  [facility] Therapist was present, directed the patient's care, made skilled judgement, and was responsible for assessment and treatment of the patient. Note: If patient does not return for scheduled/ recommended follow up visits, this note will serve as a discharge from care along with most recent update on progress.

## 2022-12-13 ENCOUNTER — HOSPITAL ENCOUNTER (OUTPATIENT)
Dept: PHYSICAL THERAPY | Age: 57
Setting detail: THERAPIES SERIES
Discharge: HOME | End: 2022-12-13
Payer: COMMERCIAL

## 2022-12-13 NOTE — FLOWSHEET NOTE
[facility] and [address] [place] [address], [place] [address], [address]  Phone: [phone]  Fax [phone]    Physical Therapy  Cancellation/No-show Note  Patient Name:  Yvonne Cunningham  :  1965   Date:  2022  Cancelled visits to date: 0  No-shows to date: 1    Patient status for today's appointment patient:  [x]  Cancelled  []  Rescheduled appointment  []  No-show     Reason given by patient:  []  Patient ill  []  Conflicting appointment  []  No transportation    []  Conflict with work  []  No reason given  [x]  Other:     Comments:  waiting until the new year.     Electronically signed by:  Anne Marie Calhoun PT

## 2023-01-03 ENCOUNTER — HOSPITAL ENCOUNTER (OUTPATIENT)
Dept: PHYSICAL THERAPY | Age: 58
Setting detail: THERAPIES SERIES
Discharge: HOME | End: 2023-01-03

## 2023-01-03 NOTE — FLOWSHEET NOTE
[facility] and [address] [place] [address], [place] [address], [address]  Phone: [phone]  Fax [phone]    Physical Therapy  Cancellation/No-show Note  Patient Name:  Janis Ortiz  :  1965   Date:  1/3/2023  Cancelled visits to date: 0  No-shows to date: 2    Patient status for today's appointment patient:  []  Cancelled  []  Rescheduled appointment  [x]  No-show     Reason given by patient:  []  Patient ill  []  Conflicting appointment  []  No transportation    []  Conflict with work  [x]  No reason given  []  Other:     Comments:      Electronically signed by:  Chintan Bowman, PT   Oksana Galeazzi, [facility] Therapist was present, directed the patient's care, made skilled judgement, and was responsible for assessment and treatment of the patient.

## 2023-03-21 RX ORDER — CALCIUM CARBONATE 500(1250)
1000 TABLET ORAL DAILY
COMMUNITY

## 2023-03-21 RX ORDER — HYDROCHLOROTHIAZIDE 25 MG/1
25 TABLET ORAL DAILY
COMMUNITY

## 2023-03-21 RX ORDER — ASCORBIC ACID 500 MG
500 TABLET ORAL DAILY
COMMUNITY

## 2023-03-21 NOTE — PROGRESS NOTES
[facility] ENDOSCOPY COLONOSCOPY PRE-OPERATIVE INSTRUCTIONS    Procedure date_03/27/2023________Arrival time__0600__________        Surgery time___0700_________       Clear liquids the day before the procedure. Do not eat or drink anything within 5 hours of your procedure. This includes water chewing gum, mints and ice chips. You may brush your teeth and gargle the morning of your surgery, but do not swallow the water    You may be asked to stop blood thinners such as Coumadin, Plavix, Fragmin, Lovenox, etc., or any anti-inflammatories such as:  Aspirin, Ibuprofen, Advil, Naproxen prior to your procedure. We also ask that you stop any OTC medications such as fish oil, vitamin E, glucosamine, garlic, Multivitamins, COQ 10, etc.    You must make arrangements for a responsible adult to arrive with you and stay in our waiting area during your procedure. They will also need to take you home after your procedure. For your safety you will not be allowed to leave alone or drive yourself home. Also for your safety, it is strongly suggested that someone stay with you the first 24 hours after your procedure. For your comfort, please wear simple loose fitting clothing to the center. Please do not bring valuables. If you have a living will and a durable power of  for healthcare, please bring in a copy.      You will need to bring a photo ID and insurance card    Our goal is to provide you with excellent care so if you have any questions, please contact us at the [facility] at [phone]         Please note these are generalized instructions for all colonoscopy cases, you may be provided with more specific instructions if necessary

## 2023-03-24 ENCOUNTER — ANESTHESIA EVENT (OUTPATIENT)
Dept: ENDOSCOPY | Age: 58
End: 2023-03-24
Payer: COMMERCIAL

## 2023-03-26 ASSESSMENT — LIFESTYLE VARIABLES: SMOKING_STATUS: 0

## 2023-03-26 ASSESSMENT — ENCOUNTER SYMPTOMS: SHORTNESS OF BREATH: 0

## 2023-03-27 ENCOUNTER — HOSPITAL ENCOUNTER (OUTPATIENT)
Age: 58
Setting detail: OUTPATIENT SURGERY
Discharge: HOME | End: 2023-03-27
Attending: INTERNAL MEDICINE | Admitting: INTERNAL MEDICINE
Payer: COMMERCIAL

## 2023-03-27 ENCOUNTER — ANESTHESIA (OUTPATIENT)
Dept: ENDOSCOPY | Age: 58
End: 2023-03-27
Payer: COMMERCIAL

## 2023-03-27 VITALS
TEMPERATURE: 96.7 F | BODY MASS INDEX: 31.58 KG/M2 | OXYGEN SATURATION: 99 % | WEIGHT: 185 LBS | HEART RATE: 80 BPM | DIASTOLIC BLOOD PRESSURE: 88 MMHG | SYSTOLIC BLOOD PRESSURE: 138 MMHG | RESPIRATION RATE: 16 BRPM | HEIGHT: 64 IN

## 2023-03-27 LAB
GLUCOSE BLD-MCNC: 139 MG/DL (ref 70–99)
PERFORMED ON: ABNORMAL

## 2023-03-27 PROCEDURE — 7100000010 HC PHASE II RECOVERY - FIRST 15 MIN: Performed by: INTERNAL MEDICINE

## 2023-03-27 PROCEDURE — 2580000003 HC RX 258: Performed by: NURSE ANESTHETIST, CERTIFIED REGISTERED

## 2023-03-27 PROCEDURE — 7100000011 HC PHASE II RECOVERY - ADDTL 15 MIN: Performed by: INTERNAL MEDICINE

## 2023-03-27 PROCEDURE — 3609027000 HC COLONOSCOPY: Performed by: INTERNAL MEDICINE

## 2023-03-27 PROCEDURE — 3700000001 HC ADD 15 MINUTES (ANESTHESIA): Performed by: INTERNAL MEDICINE

## 2023-03-27 PROCEDURE — 2580000003 HC RX 258: Performed by: ANESTHESIOLOGY

## 2023-03-27 PROCEDURE — 6360000002 HC RX W HCPCS: Performed by: NURSE ANESTHETIST, CERTIFIED REGISTERED

## 2023-03-27 PROCEDURE — 3700000000 HC ANESTHESIA ATTENDED CARE: Performed by: INTERNAL MEDICINE

## 2023-03-27 RX ORDER — PROPOFOL 10 MG/ML
INJECTION, EMULSION INTRAVENOUS CONTINUOUS PRN
Status: DISCONTINUED | OUTPATIENT
Start: 2023-03-27 | End: 2023-03-27 | Stop reason: SDUPTHER

## 2023-03-27 RX ORDER — SODIUM CHLORIDE 9 MG/ML
INJECTION, SOLUTION INTRAVENOUS CONTINUOUS PRN
Status: DISCONTINUED | OUTPATIENT
Start: 2023-03-27 | End: 2023-03-27 | Stop reason: SDUPTHER

## 2023-03-27 RX ORDER — SODIUM CHLORIDE 0.9 % (FLUSH) 0.9 %
5-40 SYRINGE (ML) INJECTION EVERY 12 HOURS SCHEDULED
Status: DISCONTINUED | OUTPATIENT
Start: 2023-03-27 | End: 2023-03-27 | Stop reason: HOSPADM

## 2023-03-27 RX ORDER — SODIUM CHLORIDE 9 MG/ML
INJECTION, SOLUTION INTRAVENOUS PRN
Status: DISCONTINUED | OUTPATIENT
Start: 2023-03-27 | End: 2023-03-27 | Stop reason: HOSPADM

## 2023-03-27 RX ORDER — SODIUM CHLORIDE 0.9 % (FLUSH) 0.9 %
5-40 SYRINGE (ML) INJECTION PRN
Status: DISCONTINUED | OUTPATIENT
Start: 2023-03-27 | End: 2023-03-27 | Stop reason: HOSPADM

## 2023-03-27 RX ADMIN — SODIUM CHLORIDE: 9 INJECTION, SOLUTION INTRAVENOUS at 07:01

## 2023-03-27 RX ADMIN — SODIUM CHLORIDE: 9 INJECTION, SOLUTION INTRAVENOUS at 06:49

## 2023-03-27 RX ADMIN — PROPOFOL 150 MCG/KG/MIN: 10 INJECTION, EMULSION INTRAVENOUS at 07:01

## 2023-03-27 ASSESSMENT — PAIN SCALES - GENERAL
PAINLEVEL_OUTOF10: 0
PAINLEVEL_OUTOF10: 0

## 2023-03-27 ASSESSMENT — PAIN - FUNCTIONAL ASSESSMENT: PAIN_FUNCTIONAL_ASSESSMENT: NONE - DENIES PAIN

## 2023-03-27 NOTE — ANESTHESIA PRE PROCEDURE
Keenan Levine MD at [address] [place] [address] History:    Social History     Tobacco Use    Smoking status: Former     Types: Cigarettes     Quit date:      Years since quittin.2    Smokeless tobacco: Never   Substance Use Topics    Alcohol use: Yes     Comment: occasionally                                Counseling given: Not Answered      Vital Signs (Current):   Vitals:    23 1320 23 0645   BP:  (!) 154/106   Pulse:  81   Resp:  16   Temp:  97.1 °F (36.2 °C)   TempSrc:  Temporal   SpO2:  98%   Weight: 180 lb (81.6 kg) 185 lb (83.9 kg)   Height: 5' 4\" (1.626 m) 5' 4\" (1.626 m)                                              BP Readings from Last 3 Encounters:   23 (!) 154/106   22 (!) 174/113   19 107/68       NPO Status: Time of last liquid consumption: 0230                        Time of last solid consumption:                         Date of last liquid consumption: 23                        Date of last solid food consumption: 23    BMI:   Wt Readings from Last 3 Encounters:   23 185 lb (83.9 kg)   10/17/22 180 lb (81.6 kg)   19 175 lb 6.4 oz (79.6 kg)     Body mass index is 31.76 kg/m². CBC: No results found for: WBC, RBC, HGB, HCT, MCV, RDW, PLT    CMP: No results found for: NA, K, CL, CO2, BUN, CREATININE, GFRAA, AGRATIO, LABGLOM, GLUCOSE, GLU, PROT, CALCIUM, BILITOT, ALKPHOS, AST, ALT    POC Tests: No results for input(s): POCGLU, POCNA, POCK, POCCL, POCBUN, POCHEMO, POCHCT in the last 72 hours.     Coags: No results found for: PROTIME, INR, APTT    HCG (If Applicable):   Lab Results   Component Value Date    PREGTESTUR Negative 2018        ABGs: No results found for: PHART, PO2ART, LRI5JLI, OSC1BDX, BEART, R2OARVVB     Type & Screen (If Applicable):  No results found for: LABABO, LABRH    Anesthesia Evaluation   no history of anesthetic complications:   Airway: Mallampati: II  TM distance: >3 FB   Neck ROM: full  Mouth

## 2023-03-27 NOTE — DISCHARGE INSTRUCTIONS
[address]  Endoscopy MOB Discharge Instructions  Colonoscopy    NAME:  Charan Ferro  YOB: 1965  MEDICAL RECORD NUMBER:  [id number]  DATE:  3/27/2023      After receiving Propofol (Diprivan) for Moderate Sedation:    Do not drive or operate any machinery until tomorrow  Do not sign any legal documents or make any critical decisions  Do not drink alcoholic beverages for 24 hours  Plan to spend a few hours resting before resuming your normal routine  Possible side effects are light headedness and sedation    You may resume your usual diet at home    Resume all your daily medications    Call your physician if any of the following occur:    Severe abdominal distention and/or pain. (Mild distention or cramping is normal after this procedure; this should improve within an hour or two with passage of air)  Fever, chills, nausea or vomiting  You may notice a small amount of blood in your next few bowel movements    If excessive bleeding occurs:  Call your physician immediately or proceed to the nearest Emergency Room    Biopsy Obtained: NO      Recommendations: Repeat colonoscopy in 10 years         For questions or concerns please contact your GI physician's 24 hour call center at [phone].

## 2023-03-27 NOTE — PROCEDURES
[facility]  Endoscopy Note    Patient: Maria De Jesus Rodríguez  : 1965  Acct#: [de-identified]    Procedure: Colonoscopy     Date:  3/27/2023    Surgeon:  Vladimir Morton MD, MD    Referring Physician:  Tran    Previous Colonoscopy: Yes  Date: 18  Greater than 3 years? Yes    Preoperative Diagnosis:  surveillance    Postoperative Diagnosis:  Normal colon    Anesthesia:  See anesthesia note    Indications: This is a 59y.o. year old female who presents today with surveillance   Procedure: An informed consent was obtained from the patient after explanation of indications, benefits, possible risks and complications of the procedure. The patient was then taken to the endoscopy suite, placed in the left lateral decubitus position, and the above IV anesthesia was administered. A digital rectal examination was performed and revealed negative without mass, lesions or tenderness. The Olympus CFQ-180-AL video colonoscope was placed in the patient's rectum under digital direction and advanced to the cecum. The cecum was identified by characteristic anatomy and ballottment. The ileocecal valve was identified. The preparation was good. The scope was then withdrawn back through the cecum, ascending, transverse, descending and sigmoid colons. Carefull circumferential examination of the mucosa in these areas demonstrated normal colonic mucosa throughout. The scope was then withdrawn into the rectum and retroflexed. The retroflexed view of the anal verge and rectum demonstrates no abnormalities. The scope was straightened, the colon was decompressed and the scope was withdrawn from the patient. The patient tolerated the procedure well and was taken to the PACU in good condition. Estimated Blood Loss:  none    Impression:  Normal colon    Recommendations:  Repeat colonoscopy in 10 years.     Vladimir Morton MD, MD   [facility]  3/27/2023

## 2023-03-27 NOTE — ADDENDUM NOTE
Addendum  created 03/27/23 1001 by Omar Morse MD    Intraprocedure Event edited, Intraprocedure Staff edited

## 2023-03-27 NOTE — ANESTHESIA POSTPROCEDURE EVALUATION
Department of Anesthesiology  Postprocedure Note    Patient: Roxi Zuleta  MRN: [id number]  YOB: 1965  Date of evaluation: 3/27/2023      Procedure Summary     Date: 03/27/23 Room / Location: [address] [place] [address]    Anesthesia Start: 0701 Anesthesia Stop: 0631    Procedure: COLORECTAL CANCER SCREENING, NOT HIGH RISK Diagnosis:       Screen for colon cancer      (Screen for colon cancer)    Surgeons: Keke Hyatt MD Responsible Provider: Antoinette Farooq MD    Anesthesia Type: MAC ASA Status: 2          Anesthesia Type: MAC    Mary Phase I: Mary Score: 10    Mary Phase II: Mary Score: 10      Anesthesia Post Evaluation    Patient location during evaluation: PACU  Patient participation: complete - patient participated  Level of consciousness: awake  Airway patency: patent  Nausea & Vomiting: no nausea and no vomiting  Complications: no  Cardiovascular status: hemodynamically stable and blood pressure returned to baseline  Respiratory status: spontaneous ventilation, nonlabored ventilation and room air  Hydration status: stable  Comments: Ms. Leidy Hightower was seen resting comfortably following procedure. Appropriate for discharge home with .

## 2023-03-27 NOTE — H&P
file    Highest education level: Not on file   Occupational History    Not on file   Tobacco Use    Smoking status: Former     Types: Cigarettes     Quit date: 12     Years since quittin.2    Smokeless tobacco: Never   Vaping Use    Vaping Use: Never used   Substance and Sexual Activity    Alcohol use: Yes     Comment: occasionally    Drug use: No    Sexual activity: Not on file   Other Topics Concern    Not on file   Social History Narrative    Not on file     Social Determinants of Health     Financial Resource Strain: Not on file   Food Insecurity: Not on file   Transportation Needs: Not on file   Physical Activity: Not on file   Stress: Not on file   Social Connections: Not on file   Intimate Partner Violence: Not on file   Housing Stability: Not on file     Family History   Problem Relation Age of Onset    High Blood Pressure Mother     High Cholesterol Mother     High Blood Pressure Father     High Cholesterol Father          PHYSICAL EXAM:      BP (!) 154/106   Pulse 81   Temp 97.1 °F (36.2 °C) (Temporal)   Resp 16   Ht 5' 4\" (1.626 m)   Wt 185 lb (83.9 kg)   LMP 2019   SpO2 98%   BMI 31.76 kg/m²  I        Heart:normal    Lungs: normal    Abdomen: normal      ASA Grade:  See anesthesia note      ASSESSMENT AND PLAN:    1. Procedure options, risks and benefits reviewed with patient and expresses understanding.

## 2023-10-30 ENCOUNTER — OFFICE VISIT (OUTPATIENT)
Dept: ORTHOPEDIC SURGERY | Age: 58
End: 2023-10-30

## 2023-10-30 VITALS — WEIGHT: 184 LBS | BODY MASS INDEX: 31.41 KG/M2 | HEIGHT: 64 IN

## 2023-10-30 DIAGNOSIS — M25.551 BILATERAL HIP PAIN: Primary | ICD-10-CM

## 2023-10-30 DIAGNOSIS — M25.552 BILATERAL HIP PAIN: Primary | ICD-10-CM

## 2023-10-30 RX ORDER — METHYLPREDNISOLONE ACETATE 40 MG/ML
40 INJECTION, SUSPENSION INTRA-ARTICULAR; INTRALESIONAL; INTRAMUSCULAR; SOFT TISSUE ONCE
Status: COMPLETED | OUTPATIENT
Start: 2023-10-30 | End: 2023-10-30

## 2023-10-30 RX ORDER — BUPIVACAINE HYDROCHLORIDE 5 MG/ML
4 INJECTION, SOLUTION PERINEURAL ONCE
Status: COMPLETED | OUTPATIENT
Start: 2023-10-30 | End: 2023-10-30

## 2023-10-30 RX ADMIN — BUPIVACAINE HYDROCHLORIDE 20 MG: 5 INJECTION, SOLUTION PERINEURAL at 13:40

## 2023-10-30 RX ADMIN — METHYLPREDNISOLONE ACETATE 40 MG: 40 INJECTION, SUSPENSION INTRA-ARTICULAR; INTRALESIONAL; INTRAMUSCULAR; SOFT TISSUE at 13:41

## 2023-11-07 NOTE — PROGRESS NOTES
the office including history, physical examination, imaging reviewing, and counseling on next steps. Lastly, time was spent discussing orders with my staff as well as providing documentation in the chart. Yvette Zuleta MD            Orthopaedic Surgery [facility] and [address] [facility] and [facility]            Team Physician [facility]      Disclaimer: This note was dictated with voice recognition software. Though review and correction are routine, we apologize for any errors.

## 2025-01-09 ENCOUNTER — HOSPITAL ENCOUNTER (EMERGENCY)
Age: 60
Discharge: HOME | End: 2025-01-09
Attending: EMERGENCY MEDICINE
Payer: COMMERCIAL

## 2025-01-09 ENCOUNTER — APPOINTMENT (OUTPATIENT)
Dept: GENERAL RADIOLOGY | Age: 60
End: 2025-01-09
Payer: COMMERCIAL

## 2025-01-09 ENCOUNTER — APPOINTMENT (OUTPATIENT)
Dept: CT IMAGING | Age: 60
End: 2025-01-09
Payer: COMMERCIAL

## 2025-01-09 VITALS
DIASTOLIC BLOOD PRESSURE: 95 MMHG | HEART RATE: 73 BPM | BODY MASS INDEX: 32.8 KG/M2 | TEMPERATURE: 97.7 F | WEIGHT: 192.1 LBS | OXYGEN SATURATION: 97 % | HEIGHT: 64 IN | RESPIRATION RATE: 14 BRPM | SYSTOLIC BLOOD PRESSURE: 162 MMHG
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
## 2025-01-09 DIAGNOSIS — S20.211A CONTUSION OF RIB ON RIGHT SIDE, INITIAL ENCOUNTER: ICD-10-CM

## 2025-01-09 DIAGNOSIS — S50.11XA CONTUSION OF RIGHT FOREARM, INITIAL ENCOUNTER: ICD-10-CM

## 2025-01-09 DIAGNOSIS — S10.93XA CONTUSION OF NECK, INITIAL ENCOUNTER: ICD-10-CM

## 2025-01-09 DIAGNOSIS — S09.90XA INJURY OF HEAD, INITIAL ENCOUNTER: Primary | ICD-10-CM

## 2025-01-09 PROCEDURE — 99284 EMERGENCY DEPT VISIT MOD MDM: CPT

## 2025-01-09 PROCEDURE — 71100 X-RAY EXAM RIBS UNI 2 VIEWS: CPT

## 2025-01-09 PROCEDURE — 70450 CT HEAD/BRAIN W/O DYE: CPT

## 2025-01-09 PROCEDURE — 73090 X-RAY EXAM OF FOREARM: CPT

## 2025-01-09 PROCEDURE — 72125 CT NECK SPINE W/O DYE: CPT

## 2025-01-09 ASSESSMENT — LIFESTYLE VARIABLES
HOW OFTEN DO YOU HAVE A DRINK CONTAINING ALCOHOL: 2-4 TIMES A MONTH
HOW MANY STANDARD DRINKS CONTAINING ALCOHOL DO YOU HAVE ON A TYPICAL DAY: 1 OR 2

## 2025-01-09 NOTE — ED TRIAGE NOTES
Patient presents to the ED from work s/p mechanical fall on ice.  Patient c/o head injury, low back pain and right arm pain.  Patient denies loc.

## 2025-01-09 NOTE — ED NOTES
Patient left prior to receiving discharge paperwork.  Paperwork and work note left with registration.  Patient did leave workers comp paperwork with registration when she left.

## 2025-01-09 NOTE — ED PROVIDER NOTES
[address]  [facility] [address]  [phone]    Schedule an appointment as soon as possible for a visit   If symptoms worsen    [facility] Emergency Department  [address]  [facility] [id number] [phone]    If symptoms worsen    [employer]  [address] [facility] [address] [phone]          DISCHARGE MEDICATIONS:  New Prescriptions    No medications on file       DISCONTINUED MEDICATIONS:  Discontinued Medications    No medications on file              (Please note that portions of this note were completed with a voice recognition program.  Efforts were made to edit the dictations but occasionally words are mis-transcribed.)    Tara Franks MD (electronically signed)            Tara Franks MD  01/09/25 5476

## 2025-03-17 ENCOUNTER — OFFICE VISIT (OUTPATIENT)
Dept: ORTHOPEDIC SURGERY | Age: 60
End: 2025-03-17
Payer: COMMERCIAL

## 2025-03-17 VITALS — BODY MASS INDEX: 31.41 KG/M2 | WEIGHT: 184 LBS | HEIGHT: 64 IN

## 2025-03-17 DIAGNOSIS — M54.50 LUMBAR PAIN: ICD-10-CM

## 2025-03-17 DIAGNOSIS — M79.671 RIGHT FOOT PAIN: Primary | ICD-10-CM

## 2025-03-17 DIAGNOSIS — M25.551 PAIN OF RIGHT HIP: ICD-10-CM

## 2025-03-17 PROCEDURE — 99204 OFFICE O/P NEW MOD 45 MIN: CPT | Performed by: ORTHOPAEDIC SURGERY

## 2025-03-17 NOTE — PROGRESS NOTES
procedures.      I, Vicky Dave ATC, am scribing for and in the presence of Dr. Boris Carr.  03/17/25 4:40 PM Vicky Dave ATC    I attest that I met personally with the patient, performed the described exam, reviewed the radiographic studies and medical records associated with this patient and supervised the services that are described above.     Boris Carr MD

## 2025-05-13 ENCOUNTER — RESULTS FOLLOW-UP (OUTPATIENT)
Dept: ORTHOPEDIC SURGERY | Age: 60
End: 2025-05-13

## 2025-06-02 ENCOUNTER — OFFICE VISIT (OUTPATIENT)
Dept: ORTHOPEDIC SURGERY | Age: 60
End: 2025-06-02
Payer: COMMERCIAL

## 2025-06-02 VITALS — HEIGHT: 64 IN | BODY MASS INDEX: 31.41 KG/M2 | WEIGHT: 184 LBS

## 2025-06-02 DIAGNOSIS — M54.12 RADICULITIS OF LEFT CERVICAL REGION: Primary | ICD-10-CM

## 2025-06-02 DIAGNOSIS — M50.30 DDD (DEGENERATIVE DISC DISEASE), CERVICAL: ICD-10-CM

## 2025-06-02 DIAGNOSIS — M54.2 NECK PAIN ON LEFT SIDE: ICD-10-CM

## 2025-06-02 PROCEDURE — 99214 OFFICE O/P EST MOD 30 MIN: CPT | Performed by: PHYSICIAN ASSISTANT

## 2025-06-02 RX ORDER — MELOXICAM 15 MG/1
15 TABLET ORAL DAILY PRN
Qty: 30 TABLET | Refills: 0 | Status: SHIPPED | OUTPATIENT
Start: 2025-06-02

## 2025-06-02 NOTE — PROGRESS NOTES
New Patient: SPINE    2025     CHIEF COMPLAINT:    Chief Complaint   Patient presents with    New Patient     LUMBAR SPINE     REFERRED BY DR. CARR       HISTORY OF PRESENT ILLNESS:              The patient is a 59 y.o. female history of hyperlipidemia, hypertension referred by Dr. Boris Carr for neck and radiating left arm pain.  She reports a 2-month history of aching and throbbing left-sided neck/scapular pain radiating into the left triceps, forearm to the first digit with numbness and tingling her symptoms are increased with any prolonged inactivity such as sitting or resting.  She reports some relief with movement and stretching.  Conservative care includes Tylenol.  She denies any recent injury to her neck but does state back in January she slipped on ice hitting her head and neck on concrete.  She was subsequently seen at the ED for a CT scan which was negative for fracture.  Her left radiating arm symptoms began 2 months prior.  At times she does report some left arm weakness but denies any progressive extremity weakness.  Denies any fine motor difficulty gait instability.  Denies current fevers chills infections.  Currently no progressive CP or SOB.    The pain assessment was noted & reviewed in the medical record today.     Current/Past Treatment:   Physical Therapy:   Chiropractic:     Injection:     Medications:            NSAIDS:             Muscle relaxer:              Steriods:              Neuropathic medications:              Opioids:            Other: TYL   Surgery/Consult:    Work Status/Functionality:     Past Medical History: Medical history form was reviewed today & scanned into the media tab  Past Medical History:   Diagnosis Date    Hyperlipidemia     Hypertension       Past Surgical History:     Past Surgical History:   Procedure Laterality Date     SECTION      COLONOSCOPY      COLONOSCOPY N/A 3/27/2023    COLORECTAL CANCER SCREENING, NOT HIGH RISK performed by

## 2025-06-06 ENCOUNTER — TELEPHONE (OUTPATIENT)
Dept: ORTHOPEDIC SURGERY | Age: 60
End: 2025-06-06

## (undated) DEVICE — FORCEPS BX 240CM 2.4MM L NDL RAD JAW 4 M00513334